# Patient Record
Sex: FEMALE | Race: BLACK OR AFRICAN AMERICAN | NOT HISPANIC OR LATINO | URBAN - METROPOLITAN AREA
[De-identification: names, ages, dates, MRNs, and addresses within clinical notes are randomized per-mention and may not be internally consistent; named-entity substitution may affect disease eponyms.]

---

## 2017-02-01 ENCOUNTER — OUTPATIENT (OUTPATIENT)
Dept: OUTPATIENT SERVICES | Facility: HOSPITAL | Age: 72
LOS: 1 days | End: 2017-02-01
Payer: COMMERCIAL

## 2017-02-01 ENCOUNTER — APPOINTMENT (OUTPATIENT)
Dept: CT IMAGING | Facility: CLINIC | Age: 72
End: 2017-02-01

## 2017-02-01 DIAGNOSIS — Z90.89 ACQUIRED ABSENCE OF OTHER ORGANS: Chronic | ICD-10-CM

## 2017-02-01 DIAGNOSIS — Z98.89 OTHER SPECIFIED POSTPROCEDURAL STATES: Chronic | ICD-10-CM

## 2017-02-01 DIAGNOSIS — Z90.710 ACQUIRED ABSENCE OF BOTH CERVIX AND UTERUS: Chronic | ICD-10-CM

## 2017-02-01 DIAGNOSIS — R93.429 ABNORMAL RADIOLOGIC FINDINGS ON DIAGNOSTIC IMAGING OF UNSPECIFIED KIDNEY: ICD-10-CM

## 2017-02-01 PROCEDURE — 74176 CT ABD & PELVIS W/O CONTRAST: CPT

## 2017-12-20 ENCOUNTER — APPOINTMENT (OUTPATIENT)
Dept: ORTHOPEDIC SURGERY | Facility: CLINIC | Age: 72
End: 2017-12-20

## 2018-03-07 ENCOUNTER — APPOINTMENT (OUTPATIENT)
Dept: ORTHOPEDIC SURGERY | Facility: CLINIC | Age: 73
End: 2018-03-07
Payer: MEDICARE

## 2018-03-07 VITALS — BODY MASS INDEX: 39.01 KG/M2 | WEIGHT: 212 LBS | HEIGHT: 62 IN

## 2018-03-07 DIAGNOSIS — Z60.2 PROBLEMS RELATED TO LIVING ALONE: ICD-10-CM

## 2018-03-07 DIAGNOSIS — Z87.39 PERSONAL HISTORY OF OTHER DISEASES OF THE MUSCULOSKELETAL SYSTEM AND CONNECTIVE TISSUE: ICD-10-CM

## 2018-03-07 DIAGNOSIS — Z82.61 FAMILY HISTORY OF ARTHRITIS: ICD-10-CM

## 2018-03-07 DIAGNOSIS — Z82.62 FAMILY HISTORY OF OSTEOPOROSIS: ICD-10-CM

## 2018-03-07 DIAGNOSIS — Z78.9 OTHER SPECIFIED HEALTH STATUS: ICD-10-CM

## 2018-03-07 DIAGNOSIS — M19.012 PRIMARY OSTEOARTHRITIS, LEFT SHOULDER: ICD-10-CM

## 2018-03-07 DIAGNOSIS — Z86.79 PERSONAL HISTORY OF OTHER DISEASES OF THE CIRCULATORY SYSTEM: ICD-10-CM

## 2018-03-07 DIAGNOSIS — Z80.9 FAMILY HISTORY OF MALIGNANT NEOPLASM, UNSPECIFIED: ICD-10-CM

## 2018-03-07 PROCEDURE — 99203 OFFICE O/P NEW LOW 30 MIN: CPT

## 2018-03-07 PROCEDURE — 73030 X-RAY EXAM OF SHOULDER: CPT | Mod: LT

## 2018-03-07 RX ORDER — LOSARTAN POTASSIUM AND HYDROCHLOROTHIAZIDE 12.5; 5 MG/1; MG/1
50-12.5 TABLET ORAL
Qty: 90 | Refills: 0 | Status: ACTIVE | COMMUNITY
Start: 2017-12-06

## 2018-03-07 SDOH — SOCIAL STABILITY - SOCIAL INSECURITY: PROBLEMS RELATED TO LIVING ALONE: Z60.2

## 2018-03-15 ENCOUNTER — OTHER (OUTPATIENT)
Age: 73
End: 2018-03-15

## 2018-03-19 ENCOUNTER — OUTPATIENT (OUTPATIENT)
Dept: OUTPATIENT SERVICES | Facility: HOSPITAL | Age: 73
LOS: 1 days | Discharge: HOME | End: 2018-03-19
Payer: COMMERCIAL

## 2018-03-19 VITALS
WEIGHT: 215.39 LBS | RESPIRATION RATE: 17 BRPM | DIASTOLIC BLOOD PRESSURE: 76 MMHG | HEIGHT: 62 IN | SYSTOLIC BLOOD PRESSURE: 145 MMHG | OXYGEN SATURATION: 99 % | HEART RATE: 70 BPM | TEMPERATURE: 98 F

## 2018-03-19 DIAGNOSIS — Z96.651 PRESENCE OF RIGHT ARTIFICIAL KNEE JOINT: Chronic | ICD-10-CM

## 2018-03-19 DIAGNOSIS — Z98.89 OTHER SPECIFIED POSTPROCEDURAL STATES: Chronic | ICD-10-CM

## 2018-03-19 DIAGNOSIS — M19.012 PRIMARY OSTEOARTHRITIS, LEFT SHOULDER: ICD-10-CM

## 2018-03-19 DIAGNOSIS — I10 ESSENTIAL (PRIMARY) HYPERTENSION: ICD-10-CM

## 2018-03-19 DIAGNOSIS — Z90.710 ACQUIRED ABSENCE OF BOTH CERVIX AND UTERUS: Chronic | ICD-10-CM

## 2018-03-19 DIAGNOSIS — M19.90 UNSPECIFIED OSTEOARTHRITIS, UNSPECIFIED SITE: ICD-10-CM

## 2018-03-19 DIAGNOSIS — Z90.89 ACQUIRED ABSENCE OF OTHER ORGANS: Chronic | ICD-10-CM

## 2018-03-19 DIAGNOSIS — Z01.818 ENCOUNTER FOR OTHER PREPROCEDURAL EXAMINATION: ICD-10-CM

## 2018-03-19 LAB
ANION GAP SERPL CALC-SCNC: 6 MMOL/L — SIGNIFICANT CHANGE UP (ref 5–17)
APTT BLD: 26.4 SEC — LOW (ref 27.5–37.4)
BASOPHILS # BLD AUTO: 0.04 K/UL — SIGNIFICANT CHANGE UP (ref 0–0.2)
BASOPHILS NFR BLD AUTO: 0.5 % — SIGNIFICANT CHANGE UP (ref 0–2)
BUN SERPL-MCNC: 19 MG/DL — SIGNIFICANT CHANGE UP (ref 7–23)
CALCIUM SERPL-MCNC: 9.3 MG/DL — SIGNIFICANT CHANGE UP (ref 8.5–10.1)
CHLORIDE SERPL-SCNC: 107 MMOL/L — SIGNIFICANT CHANGE UP (ref 96–108)
CO2 SERPL-SCNC: 30 MMOL/L — SIGNIFICANT CHANGE UP (ref 22–31)
CREAT SERPL-MCNC: 0.89 MG/DL — SIGNIFICANT CHANGE UP (ref 0.5–1.3)
EOSINOPHIL # BLD AUTO: 0.03 K/UL — SIGNIFICANT CHANGE UP (ref 0–0.5)
EOSINOPHIL NFR BLD AUTO: 0.4 % — SIGNIFICANT CHANGE UP (ref 0–6)
GLUCOSE SERPL-MCNC: 113 MG/DL — HIGH (ref 70–99)
HBA1C BLD-MCNC: 5.5 % — SIGNIFICANT CHANGE UP (ref 4–5.6)
HCT VFR BLD CALC: 35.3 % — SIGNIFICANT CHANGE UP (ref 34.5–45)
HGB BLD-MCNC: 11.5 G/DL — SIGNIFICANT CHANGE UP (ref 11.5–15.5)
IMM GRANULOCYTES NFR BLD AUTO: 0.1 % — SIGNIFICANT CHANGE UP (ref 0–1.5)
INR BLD: 1.05 RATIO — SIGNIFICANT CHANGE UP (ref 0.88–1.16)
LYMPHOCYTES # BLD AUTO: 1.88 K/UL — SIGNIFICANT CHANGE UP (ref 1–3.3)
LYMPHOCYTES # BLD AUTO: 24.5 % — SIGNIFICANT CHANGE UP (ref 13–44)
MCHC RBC-ENTMCNC: 30 PG — SIGNIFICANT CHANGE UP (ref 27–34)
MCHC RBC-ENTMCNC: 32.6 GM/DL — SIGNIFICANT CHANGE UP (ref 32–36)
MCV RBC AUTO: 92.2 FL — SIGNIFICANT CHANGE UP (ref 80–100)
MONOCYTES # BLD AUTO: 0.37 K/UL — SIGNIFICANT CHANGE UP (ref 0–0.9)
MONOCYTES NFR BLD AUTO: 4.8 % — SIGNIFICANT CHANGE UP (ref 2–14)
MRSA PCR RESULT.: SIGNIFICANT CHANGE UP
NEUTROPHILS # BLD AUTO: 5.35 K/UL — SIGNIFICANT CHANGE UP (ref 1.8–7.4)
NEUTROPHILS NFR BLD AUTO: 69.7 % — SIGNIFICANT CHANGE UP (ref 43–77)
PLATELET # BLD AUTO: 249 K/UL — SIGNIFICANT CHANGE UP (ref 150–400)
POTASSIUM SERPL-MCNC: 3.7 MMOL/L — SIGNIFICANT CHANGE UP (ref 3.5–5.3)
POTASSIUM SERPL-SCNC: 3.7 MMOL/L — SIGNIFICANT CHANGE UP (ref 3.5–5.3)
PROTHROM AB SERPL-ACNC: 11.5 SEC — SIGNIFICANT CHANGE UP (ref 9.8–12.7)
RBC # BLD: 3.83 M/UL — SIGNIFICANT CHANGE UP (ref 3.8–5.2)
RBC # FLD: 13.3 % — SIGNIFICANT CHANGE UP (ref 10.3–14.5)
S AUREUS DNA NOSE QL NAA+PROBE: SIGNIFICANT CHANGE UP
SODIUM SERPL-SCNC: 143 MMOL/L — SIGNIFICANT CHANGE UP (ref 135–145)
WBC # BLD: 7.68 K/UL — SIGNIFICANT CHANGE UP (ref 3.8–10.5)
WBC # FLD AUTO: 7.68 K/UL — SIGNIFICANT CHANGE UP (ref 3.8–10.5)

## 2018-03-19 PROCEDURE — 93010 ELECTROCARDIOGRAM REPORT: CPT | Mod: NC

## 2018-03-19 RX ORDER — SODIUM CHLORIDE 9 MG/ML
3 INJECTION INTRAMUSCULAR; INTRAVENOUS; SUBCUTANEOUS EVERY 8 HOURS
Qty: 0 | Refills: 0 | Status: DISCONTINUED | OUTPATIENT
Start: 2018-04-26 | End: 2018-04-27

## 2018-03-19 NOTE — H&P PST ADULT - PMH
Arthritis    Hypertension    Knee pain, right    Thyroid mass  Ct chest finding 1/12/16; enlarged right lobe of thyroid gland extending to mediastinum

## 2018-03-19 NOTE — PATIENT PROFILE ADULT. - PSH
History of total right knee replacement    S/P colonoscopy  4 years ago  S/P endoscopy  2 years ago  S/P JOSH (total abdominal hysterectomy)    S/P tonsillectomy

## 2018-03-19 NOTE — H&P PST ADULT - HISTORY OF PRESENT ILLNESS
73F [facility] ____ here for PST for scheduled ______ 73F [facility] htn, goiter, narrow angle glaucoma, arthritis  here for PST for scheduled Left total shoulder replacement vs. Reverse total shoulder replacement

## 2018-03-19 NOTE — H&P PST ADULT - PSH
S/P colonoscopy  4 years ago  S/P endoscopy  2 years ago  S/P JOSH (total abdominal hysterectomy)    S/P tonsillectomy History of total right knee replacement    S/P colonoscopy  4 years ago  S/P endoscopy  2 years ago  S/P JOSH (total abdominal hysterectomy)    S/P tonsillectomy

## 2018-03-19 NOTE — H&P PST ADULT - NSANTHOSAYNRD_GEN_A_CORE
No. NEEMA screening performed.  STOP BANG Legend: 0-2 = LOW Risk; 3-4 = INTERMEDIATE Risk; 5-8 = HIGH Risk

## 2018-03-19 NOTE — H&P PST ADULT - FAMILY HISTORY
Mother  Still living? No  Family history of diabetes mellitus, Age at diagnosis: Age Unknown     Father  Still living? No  Family history of hypertension, Age at diagnosis: Age Unknown

## 2018-03-19 NOTE — H&P PST ADULT - ASSESSMENT
73F [facility] htn, goiter, narrow angle glaucoma, arthritis  here for PST for scheduled Left total shoulder replacement vs. Reverse total shoulder replacement

## 2018-04-25 ENCOUNTER — TRANSCRIPTION ENCOUNTER (OUTPATIENT)
Age: 73
End: 2018-04-25

## 2018-04-26 ENCOUNTER — INPATIENT (INPATIENT)
Facility: HOSPITAL | Age: 73
LOS: 0 days | Discharge: HOME HEALTH | End: 2018-04-27
Attending: ORTHOPAEDIC SURGERY | Admitting: ORTHOPAEDIC SURGERY
Payer: COMMERCIAL

## 2018-04-26 ENCOUNTER — RESULT REVIEW (OUTPATIENT)
Age: 73
End: 2018-04-26

## 2018-04-26 ENCOUNTER — APPOINTMENT (OUTPATIENT)
Dept: ORTHOPEDIC SURGERY | Facility: HOSPITAL | Age: 73
End: 2018-04-26

## 2018-04-26 ENCOUNTER — TRANSCRIPTION ENCOUNTER (OUTPATIENT)
Age: 73
End: 2018-04-26

## 2018-04-26 VITALS
RESPIRATION RATE: 16 BRPM | TEMPERATURE: 98 F | DIASTOLIC BLOOD PRESSURE: 73 MMHG | OXYGEN SATURATION: 98 % | WEIGHT: 215.39 LBS | HEIGHT: 62 IN | HEART RATE: 88 BPM | SYSTOLIC BLOOD PRESSURE: 145 MMHG

## 2018-04-26 DIAGNOSIS — Z90.710 ACQUIRED ABSENCE OF BOTH CERVIX AND UTERUS: Chronic | ICD-10-CM

## 2018-04-26 DIAGNOSIS — Z98.89 OTHER SPECIFIED POSTPROCEDURAL STATES: Chronic | ICD-10-CM

## 2018-04-26 DIAGNOSIS — Z96.651 PRESENCE OF RIGHT ARTIFICIAL KNEE JOINT: Chronic | ICD-10-CM

## 2018-04-26 DIAGNOSIS — Z90.89 ACQUIRED ABSENCE OF OTHER ORGANS: Chronic | ICD-10-CM

## 2018-04-26 LAB
ANION GAP SERPL CALC-SCNC: 8 MMOL/L — SIGNIFICANT CHANGE UP (ref 5–17)
BUN SERPL-MCNC: 22 MG/DL — SIGNIFICANT CHANGE UP (ref 7–23)
CALCIUM SERPL-MCNC: 9.3 MG/DL — SIGNIFICANT CHANGE UP (ref 8.5–10.1)
CHLORIDE SERPL-SCNC: 108 MMOL/L — SIGNIFICANT CHANGE UP (ref 96–108)
CO2 SERPL-SCNC: 26 MMOL/L — SIGNIFICANT CHANGE UP (ref 22–31)
CREAT SERPL-MCNC: 1.07 MG/DL — SIGNIFICANT CHANGE UP (ref 0.5–1.3)
GLUCOSE SERPL-MCNC: 127 MG/DL — HIGH (ref 70–99)
HCT VFR BLD CALC: 31.7 % — LOW (ref 34.5–45)
HGB BLD-MCNC: 10.3 G/DL — LOW (ref 11.5–15.5)
MCHC RBC-ENTMCNC: 30.5 PG — SIGNIFICANT CHANGE UP (ref 27–34)
MCHC RBC-ENTMCNC: 32.5 GM/DL — SIGNIFICANT CHANGE UP (ref 32–36)
MCV RBC AUTO: 93.8 FL — SIGNIFICANT CHANGE UP (ref 80–100)
NRBC # BLD: 0 /100 WBCS — SIGNIFICANT CHANGE UP (ref 0–0)
PLATELET # BLD AUTO: 241 K/UL — SIGNIFICANT CHANGE UP (ref 150–400)
POTASSIUM SERPL-MCNC: 3.8 MMOL/L — SIGNIFICANT CHANGE UP (ref 3.5–5.3)
POTASSIUM SERPL-SCNC: 3.8 MMOL/L — SIGNIFICANT CHANGE UP (ref 3.5–5.3)
RBC # BLD: 3.38 M/UL — LOW (ref 3.8–5.2)
RBC # FLD: 13.1 % — SIGNIFICANT CHANGE UP (ref 10.3–14.5)
SODIUM SERPL-SCNC: 142 MMOL/L — SIGNIFICANT CHANGE UP (ref 135–145)
WBC # BLD: 12.83 K/UL — HIGH (ref 3.8–10.5)
WBC # FLD AUTO: 12.83 K/UL — HIGH (ref 3.8–10.5)

## 2018-04-26 PROCEDURE — 23472 RECONSTRUCT SHOULDER JOINT: CPT | Mod: LT

## 2018-04-26 PROCEDURE — 23430 REPAIR BICEPS TENDON: CPT | Mod: 59,LT

## 2018-04-26 PROCEDURE — 88309 TISSUE EXAM BY PATHOLOGIST: CPT | Mod: 26

## 2018-04-26 PROCEDURE — 73030 X-RAY EXAM OF SHOULDER: CPT | Mod: 26,LT

## 2018-04-26 PROCEDURE — 88311 DECALCIFY TISSUE: CPT | Mod: 26

## 2018-04-26 RX ORDER — LOSARTAN POTASSIUM 100 MG/1
50 TABLET, FILM COATED ORAL DAILY
Qty: 0 | Refills: 0 | Status: DISCONTINUED | OUTPATIENT
Start: 2018-04-26 | End: 2018-04-27

## 2018-04-26 RX ORDER — ZALEPLON 10 MG
5 CAPSULE ORAL AT BEDTIME
Qty: 0 | Refills: 0 | Status: DISCONTINUED | OUTPATIENT
Start: 2018-04-26 | End: 2018-04-27

## 2018-04-26 RX ORDER — DOCUSATE SODIUM 100 MG
100 CAPSULE ORAL
Qty: 0 | Refills: 0 | Status: DISCONTINUED | OUTPATIENT
Start: 2018-04-26 | End: 2018-04-27

## 2018-04-26 RX ORDER — BENZOCAINE AND MENTHOL 5; 1 G/100ML; G/100ML
1 LIQUID ORAL
Qty: 0 | Refills: 0 | Status: DISCONTINUED | OUTPATIENT
Start: 2018-04-26 | End: 2018-04-27

## 2018-04-26 RX ORDER — ASCORBIC ACID 60 MG
500 TABLET,CHEWABLE ORAL
Qty: 0 | Refills: 0 | Status: DISCONTINUED | OUTPATIENT
Start: 2018-04-26 | End: 2018-04-27

## 2018-04-26 RX ORDER — FERROUS SULFATE 325(65) MG
325 TABLET ORAL
Qty: 0 | Refills: 0 | Status: DISCONTINUED | OUTPATIENT
Start: 2018-04-26 | End: 2018-04-27

## 2018-04-26 RX ORDER — ACETAMINOPHEN 500 MG
1000 TABLET ORAL ONCE
Qty: 0 | Refills: 0 | Status: COMPLETED | OUTPATIENT
Start: 2018-04-26 | End: 2018-04-26

## 2018-04-26 RX ORDER — HYDROCHLOROTHIAZIDE 25 MG
12.5 TABLET ORAL DAILY
Qty: 0 | Refills: 0 | Status: DISCONTINUED | OUTPATIENT
Start: 2018-04-26 | End: 2018-04-27

## 2018-04-26 RX ORDER — ACETAMINOPHEN 500 MG
650 TABLET ORAL EVERY 6 HOURS
Qty: 0 | Refills: 0 | Status: DISCONTINUED | OUTPATIENT
Start: 2018-04-26 | End: 2018-04-27

## 2018-04-26 RX ORDER — SODIUM CHLORIDE 9 MG/ML
1000 INJECTION, SOLUTION INTRAVENOUS
Qty: 0 | Refills: 0 | Status: DISCONTINUED | OUTPATIENT
Start: 2018-04-26 | End: 2018-04-26

## 2018-04-26 RX ORDER — OXYCODONE HYDROCHLORIDE 5 MG/1
5 TABLET ORAL EVERY 4 HOURS
Qty: 0 | Refills: 0 | Status: DISCONTINUED | OUTPATIENT
Start: 2018-04-26 | End: 2018-04-27

## 2018-04-26 RX ORDER — DIPHENHYDRAMINE HCL 50 MG
25 CAPSULE ORAL EVERY 6 HOURS
Qty: 0 | Refills: 0 | Status: DISCONTINUED | OUTPATIENT
Start: 2018-04-26 | End: 2018-04-27

## 2018-04-26 RX ORDER — MORPHINE SULFATE 50 MG/1
4 CAPSULE, EXTENDED RELEASE ORAL
Qty: 0 | Refills: 0 | Status: DISCONTINUED | OUTPATIENT
Start: 2018-04-26 | End: 2018-04-26

## 2018-04-26 RX ORDER — HYDROMORPHONE HYDROCHLORIDE 2 MG/ML
0.5 INJECTION INTRAMUSCULAR; INTRAVENOUS; SUBCUTANEOUS
Qty: 0 | Refills: 0 | Status: DISCONTINUED | OUTPATIENT
Start: 2018-04-26 | End: 2018-04-27

## 2018-04-26 RX ORDER — CEFAZOLIN SODIUM 1 G
2000 VIAL (EA) INJECTION EVERY 8 HOURS
Qty: 0 | Refills: 0 | Status: COMPLETED | OUTPATIENT
Start: 2018-04-26 | End: 2018-04-27

## 2018-04-26 RX ORDER — ONDANSETRON 8 MG/1
4 TABLET, FILM COATED ORAL ONCE
Qty: 0 | Refills: 0 | Status: DISCONTINUED | OUTPATIENT
Start: 2018-04-26 | End: 2018-04-26

## 2018-04-26 RX ORDER — MAGNESIUM HYDROXIDE 400 MG/1
30 TABLET, CHEWABLE ORAL DAILY
Qty: 0 | Refills: 0 | Status: DISCONTINUED | OUTPATIENT
Start: 2018-04-26 | End: 2018-04-27

## 2018-04-26 RX ORDER — ASPIRIN/CALCIUM CARB/MAGNESIUM 324 MG
1 TABLET ORAL
Qty: 30 | Refills: 0
Start: 2018-04-26 | End: 2018-05-25

## 2018-04-26 RX ORDER — ONDANSETRON 8 MG/1
4 TABLET, FILM COATED ORAL EVERY 6 HOURS
Qty: 0 | Refills: 0 | Status: DISCONTINUED | OUTPATIENT
Start: 2018-04-26 | End: 2018-04-27

## 2018-04-26 RX ORDER — FOLIC ACID 0.8 MG
1 TABLET ORAL DAILY
Qty: 0 | Refills: 0 | Status: DISCONTINUED | OUTPATIENT
Start: 2018-04-26 | End: 2018-04-27

## 2018-04-26 RX ORDER — OXYCODONE HYDROCHLORIDE 5 MG/1
10 TABLET ORAL EVERY 4 HOURS
Qty: 0 | Refills: 0 | Status: DISCONTINUED | OUTPATIENT
Start: 2018-04-26 | End: 2018-04-27

## 2018-04-26 RX ORDER — ASPIRIN/CALCIUM CARB/MAGNESIUM 324 MG
325 TABLET ORAL DAILY
Qty: 0 | Refills: 0 | Status: DISCONTINUED | OUTPATIENT
Start: 2018-04-27 | End: 2018-04-27

## 2018-04-26 RX ORDER — SODIUM CHLORIDE 9 MG/ML
1000 INJECTION, SOLUTION INTRAVENOUS
Qty: 0 | Refills: 0 | Status: DISCONTINUED | OUTPATIENT
Start: 2018-04-26 | End: 2018-04-27

## 2018-04-26 RX ADMIN — BENZOCAINE AND MENTHOL 1 LOZENGE: 5; 1 LIQUID ORAL at 22:25

## 2018-04-26 RX ADMIN — Medication 100 MILLIGRAM(S): at 23:05

## 2018-04-26 RX ADMIN — SODIUM CHLORIDE 3 MILLILITER(S): 9 INJECTION INTRAMUSCULAR; INTRAVENOUS; SUBCUTANEOUS at 22:16

## 2018-04-26 RX ADMIN — Medication 400 MILLIGRAM(S): at 17:55

## 2018-04-26 RX ADMIN — SODIUM CHLORIDE 75 MILLILITER(S): 9 INJECTION, SOLUTION INTRAVENOUS at 17:55

## 2018-04-26 RX ADMIN — Medication 1000 MILLIGRAM(S): at 18:23

## 2018-04-26 RX ADMIN — SODIUM CHLORIDE 75 MILLILITER(S): 9 INJECTION, SOLUTION INTRAVENOUS at 18:00

## 2018-04-26 NOTE — DISCHARGE NOTE ADULT - PLAN OF CARE
return to ADLs Reverse Total Shoulder Discharge Instructions     1. Activity:  Maintain sling at all times. Elevate hand and wiggle fingers. ROM restricted to GENTLE Internal rotation to chest, Full forward elevation, Neutral external rotation, no extension, no abduction.    2. Call with fever over 101, wound redness, drainage.    3. Wound care: Do not remove or change dressing unless saturated.  IF so, apply dry gauze, tegaderm.     4. Continue to apply ICE packs.    5. DVT PE Prophylaxis: ECASA 325 QD OR Lovenox for 2 weeks or per AC Team. See med Rec.    6. Follow Up: Orthopedics,  10-14 days in office. Call to schedule. If going home, eRx sent to your pharmacy for . Please refer to instruction sheet provided by Dr. Catherine

## 2018-04-26 NOTE — DISCHARGE NOTE ADULT - CARE PLAN
Principal Discharge DX:	Rotator cuff arthropathy  Goal:	return to ADLs  Assessment and plan of treatment:	Reverse Total Shoulder Discharge Instructions     1. Activity:  Maintain sling at all times. Elevate hand and wiggle fingers. ROM restricted to GENTLE Internal rotation to chest, Full forward elevation, Neutral external rotation, no extension, no abduction.    2. Call with fever over 101, wound redness, drainage.    3. Wound care: Do not remove or change dressing unless saturated.  IF so, apply dry gauze, tegaderm.     4. Continue to apply ICE packs.    5. DVT PE Prophylaxis: ECASA 325 QD OR Lovenox for 2 weeks or per AC Team. See med Rec.    6. Follow Up: Orthopedics,  10-14 days in office. Call to schedule. If going home, eRx sent to your pharmacy for . Principal Discharge DX:	Rotator cuff arthropathy  Goal:	return to ADLs  Assessment and plan of treatment:	Please refer to instruction sheet provided by Dr. Catherine

## 2018-04-26 NOTE — PROGRESS NOTE ADULT - SUBJECTIVE AND OBJECTIVE BOX
Ortho post op check     Patient seen and examined. Pain controlled. Tolerated procedure well     Vital Signs Last 24 Hrs  T(C): 37.3 (04-26-18 @ 22:14), Max: 37.7 (04-26-18 @ 20:06)  T(F): 99.1 (04-26-18 @ 22:14), Max: 99.9 (04-26-18 @ 20:06)  HR: 85 (04-26-18 @ 22:14) (78 - 88)  BP: 111/71 (04-26-18 @ 22:14) (111/71 - 162/75)  BP(mean): --  RR: 16 (04-26-18 @ 22:14) (14 - 22)  SpO2: 96% (04-26-18 @ 22:14) (95% - 100%)    Physical Exam  Gen: NAD  LUE:   Dressing c/d/i  +AIN/PIN/Med/Msk/Uln function  Unable to fully assess sensation as patient recieved nerve block   Rad pulse intact  Compartments soft  No calf ttp    A/P: 73y Female sp L Reverse TSA  Pain control  DVT ppx  PT/WBAT/OOB  FU labs  Dispo planning  D/w attending

## 2018-04-26 NOTE — DISCHARGE NOTE ADULT - CARE PROVIDER_API CALL
Bunny Catherine), Orthopaedic Surgery  [facility] [place] [address]  Phone: [phone]  Fax: [phone]

## 2018-04-26 NOTE — DISCHARGE NOTE ADULT - HOSPITAL COURSE
H&P:  Pt is a 73y Female  PAST MEDICAL & SURGICAL HISTORY:  Thyroid mass: Ct chest finding 1/12/16; enlarged right lobe of thyroid gland extending to mediastinum  Knee pain, right  Arthritis  Hypertension  History of total right knee replacement  S/P endoscopy: 2 years ago  S/P colonoscopy: 4 years ago  S/P tonsillectomy  S/P JOSH (total abdominal hysterectomy)   s/p Total Shoulder Arthroplasty. Pt is afebrile with stable vital signs. Pain is controlled. Alert and Oriented. Exam reveals intact AIN/PIN, wrist flexion and wrist extension, 2+Radial Pulse. Dressing is clean and dry with telfa/tegaderm on.    Vital Signs Last 24 Hrs  T(C): 36.7 (04-26-18 @ 19:03), Max: 36.9 (04-26-18 @ 18:38)  T(F): 98 (04-26-18 @ 19:03), Max: 98.4 (04-26-18 @ 18:38)  HR: 84 (04-26-18 @ 19:03) (78 - 88)  BP: 131/58 (04-26-18 @ 19:03) (129/69 - 162/75)  BP(mean): --  RR: 17 (04-26-18 @ 19:03) (14 - 22)  SpO2: 95% (04-26-18 @ 19:03) (95% - 100%)                        10.3   12.83 )-----------( 241      ( 26 Apr 2018 18:07 )             31.7         Hospital Course:  Patient presented to [facility] after being medically cleared for an elective surgical procedure, having failed outpatient non-operative conservative management. Prophylactic antibiotics were started before the procedure and continued for 24 hours. They were admitted after surgery to the orthopedic floor.   There were no complications during the hospital stay.     Routine consults were obtained from the Anticoagulation Team for DVT/PE prophylaxis, from Physical Therapy for twice daily PT ROM limited to full forward elevation, Internal rotaion to Chest, external rotation to neutral, no extension. Consult to Hospitalist for Medical Co-management. Patient was placed on Lovenox for anticoagulation.  Pertinent home medications were continued.  Daily labs were followed.      On POD 0 there were no overnight events and the pt was OOB with PT. POD1 hemovac drain (if present) was removed. POD 1, PT was continued, and on POD 1 or 2 pt is ready today for DC to home or to Rehab. The orthopedic Attending is aware and agrees. H&P:  Pt is a 73y Female  PAST MEDICAL & SURGICAL HISTORY:  Thyroid mass: Ct chest finding 1/12/16; enlarged right lobe of thyroid gland extending to mediastinum  Knee pain, right  Arthritis  Hypertension  History of total right knee replacement  S/P endoscopy: 2 years ago  S/P colonoscopy: 4 years ago  S/P tonsillectomy  S/P JOSH (total abdominal hysterectomy)   s/p Total Shoulder Arthroplasty. Pt is afebrile with stable vital signs. Pain is controlled. Alert and Oriented. Exam reveals intact AIN/PIN, wrist flexion and wrist extension, 2+Radial Pulse. Dressing is clean and dry with telfa/tegaderm on.    Vital Signs Last 24 Hrs  T(C): 36.7 (04-26-18 @ 19:03), Max: 36.9 (04-26-18 @ 18:38)  T(F): 98 (04-26-18 @ 19:03), Max: 98.4 (04-26-18 @ 18:38)  HR: 84 (04-26-18 @ 19:03) (78 - 88)  BP: 131/58 (04-26-18 @ 19:03) (129/69 - 162/75)  BP(mean): --  RR: 17 (04-26-18 @ 19:03) (14 - 22)  SpO2: 95% (04-26-18 @ 19:03) (95% - 100%)                        10.3   12.83 )-----------( 241      ( 26 Apr 2018 18:07 )             31.7         Hospital Course:  Patient presented to [facility] after being medically cleared for an elective surgical procedure, having failed outpatient non-operative conservative management. Prophylactic antibiotics were started before the procedure and continued for 24 hours. They were admitted after surgery to the orthopedic floor.   There were no complications during the hospital stay.     Patient was placed on Lovenox for anticoagulation.  Pertinent home medications were continued.  Daily labs were followed.      On POD 0 there were no overnight events and the pt was OOB with PT. POD1 hemovac drain was removed. POD 1 or 2 pt is ready today for DC to home. The orthopedic Attending is aware and agrees.

## 2018-04-26 NOTE — DISCHARGE NOTE ADULT - PATIENT PORTAL LINK FT
You can access the [facility], offered by [facility], by registering with the following website: [email or web address]

## 2018-04-26 NOTE — BRIEF OPERATIVE NOTE - PROCEDURE
<<-----Click on this checkbox to enter Procedure Reverse total shoulder arthroplasty  04/26/2018    Active  [facility]

## 2018-04-26 NOTE — DISCHARGE NOTE ADULT - MEDICATION SUMMARY - MEDICATIONS TO TAKE
I will START or STAY ON the medications listed below when I get home from the hospital:    Ecotrin 325 mg oral delayed release tablet  -- 1 tab(s) by mouth once a day MDD:1  -- Swallow whole.  Do not crush.  Take with food or milk.    -- Indication: For dvt ppx    Percocet 5/325 oral tablet  -- 1 tab(s) by mouth every 4 to 6 hours, As Needed -for severe pain MDD:4-6 tabs   -- Caution federal law prohibits the transfer of this drug to any person other  than the person for whom it was prescribed.  May cause drowsiness.  Alcohol may intensify this effect.  Use care when operating dangerous machinery.  This prescription cannot be refilled.  This product contains acetaminophen.  Do not use  with any other product containing acetaminophen to prevent possible liver damage.  Using more of this medication than prescribed may cause serious breathing problems.    -- Indication: For severe pain    Chlorpheniramine (Allergy)  -- 1   2 times a day, As Needed  -- Indication: For home med    hydrochlorothiazide-losartan 12.5 mg-50 mg oral tablet  -- 1 tab(s) by mouth once a day  -- Indication: For home med    Colace 100 mg oral capsule  -- 2 cap(s) by mouth once a day  -- Indication: For home med    Calcium 600+D oral tablet  -- 1 tab(s) by mouth once a day  -- Indication: For home med    Vitamin D3 2000 intl units oral tablet  -- 1 tab(s) by mouth once a day  -- Indication: For home med

## 2018-04-26 NOTE — DISCHARGE NOTE ADULT - MEDICATION SUMMARY - MEDICATIONS TO STOP TAKING
I will STOP taking the medications listed below when I get home from the hospital:    acetaminophen 500 mg oral tablet  -- 2 tab(s) by mouth every 8 hours, As Needed    traMADol 50 mg oral tablet  -- 1 tab(s) by mouth every 4 hours, As needed, Mild Pain    tramadol-acetaminophen 37.5 mg-325 mg oral tablet  -- 1 tab(s) by mouth once a day, As Needed

## 2018-04-27 VITALS
RESPIRATION RATE: 17 BRPM | TEMPERATURE: 98 F | SYSTOLIC BLOOD PRESSURE: 109 MMHG | HEART RATE: 83 BPM | OXYGEN SATURATION: 99 % | DIASTOLIC BLOOD PRESSURE: 55 MMHG

## 2018-04-27 LAB
ANION GAP SERPL CALC-SCNC: 8 MMOL/L — SIGNIFICANT CHANGE UP (ref 5–17)
BUN SERPL-MCNC: 20 MG/DL — SIGNIFICANT CHANGE UP (ref 7–23)
CALCIUM SERPL-MCNC: 8.8 MG/DL — SIGNIFICANT CHANGE UP (ref 8.5–10.1)
CHLORIDE SERPL-SCNC: 106 MMOL/L — SIGNIFICANT CHANGE UP (ref 96–108)
CO2 SERPL-SCNC: 27 MMOL/L — SIGNIFICANT CHANGE UP (ref 22–31)
CREAT SERPL-MCNC: 1.04 MG/DL — SIGNIFICANT CHANGE UP (ref 0.5–1.3)
GLUCOSE SERPL-MCNC: 108 MG/DL — HIGH (ref 70–99)
HCT VFR BLD CALC: 27.3 % — LOW (ref 34.5–45)
HGB BLD-MCNC: 9 G/DL — LOW (ref 11.5–15.5)
MCHC RBC-ENTMCNC: 30.7 PG — SIGNIFICANT CHANGE UP (ref 27–34)
MCHC RBC-ENTMCNC: 33 GM/DL — SIGNIFICANT CHANGE UP (ref 32–36)
MCV RBC AUTO: 93.2 FL — SIGNIFICANT CHANGE UP (ref 80–100)
NRBC # BLD: 0 /100 WBCS — SIGNIFICANT CHANGE UP (ref 0–0)
PLATELET # BLD AUTO: 216 K/UL — SIGNIFICANT CHANGE UP (ref 150–400)
POTASSIUM SERPL-MCNC: 3.7 MMOL/L — SIGNIFICANT CHANGE UP (ref 3.5–5.3)
POTASSIUM SERPL-SCNC: 3.7 MMOL/L — SIGNIFICANT CHANGE UP (ref 3.5–5.3)
RBC # BLD: 2.93 M/UL — LOW (ref 3.8–5.2)
RBC # FLD: 13 % — SIGNIFICANT CHANGE UP (ref 10.3–14.5)
SODIUM SERPL-SCNC: 141 MMOL/L — SIGNIFICANT CHANGE UP (ref 135–145)
WBC # BLD: 13.94 K/UL — HIGH (ref 3.8–10.5)
WBC # FLD AUTO: 13.94 K/UL — HIGH (ref 3.8–10.5)

## 2018-04-27 RX ORDER — ONDANSETRON 8 MG/1
4 TABLET, FILM COATED ORAL EVERY 6 HOURS
Qty: 0 | Refills: 0 | Status: DISCONTINUED | OUTPATIENT
Start: 2018-04-27 | End: 2018-04-27

## 2018-04-27 RX ORDER — MORPHINE SULFATE 50 MG/1
2 CAPSULE, EXTENDED RELEASE ORAL
Qty: 0 | Refills: 0 | Status: DISCONTINUED | OUTPATIENT
Start: 2018-04-27 | End: 2018-04-27

## 2018-04-27 RX ADMIN — Medication 1 TABLET(S): at 12:12

## 2018-04-27 RX ADMIN — Medication 325 MILLIGRAM(S): at 09:39

## 2018-04-27 RX ADMIN — Medication 100 MILLIGRAM(S): at 06:15

## 2018-04-27 RX ADMIN — SODIUM CHLORIDE 3 MILLILITER(S): 9 INJECTION INTRAMUSCULAR; INTRAVENOUS; SUBCUTANEOUS at 14:53

## 2018-04-27 RX ADMIN — OXYCODONE HYDROCHLORIDE 10 MILLIGRAM(S): 5 TABLET ORAL at 17:18

## 2018-04-27 RX ADMIN — Medication 325 MILLIGRAM(S): at 12:12

## 2018-04-27 RX ADMIN — MORPHINE SULFATE 2 MILLIGRAM(S): 50 CAPSULE, EXTENDED RELEASE ORAL at 12:30

## 2018-04-27 RX ADMIN — BENZOCAINE AND MENTHOL 1 LOZENGE: 5; 1 LIQUID ORAL at 06:50

## 2018-04-27 RX ADMIN — Medication 325 MILLIGRAM(S): at 17:18

## 2018-04-27 RX ADMIN — Medication 1 MILLIGRAM(S): at 12:12

## 2018-04-27 RX ADMIN — SODIUM CHLORIDE 3 MILLILITER(S): 9 INJECTION INTRAMUSCULAR; INTRAVENOUS; SUBCUTANEOUS at 06:15

## 2018-04-27 RX ADMIN — OXYCODONE HYDROCHLORIDE 10 MILLIGRAM(S): 5 TABLET ORAL at 10:55

## 2018-04-27 RX ADMIN — Medication 500 MILLIGRAM(S): at 06:15

## 2018-04-27 RX ADMIN — OXYCODONE HYDROCHLORIDE 10 MILLIGRAM(S): 5 TABLET ORAL at 02:28

## 2018-04-27 RX ADMIN — OXYCODONE HYDROCHLORIDE 10 MILLIGRAM(S): 5 TABLET ORAL at 11:50

## 2018-04-27 RX ADMIN — OXYCODONE HYDROCHLORIDE 10 MILLIGRAM(S): 5 TABLET ORAL at 03:30

## 2018-04-27 RX ADMIN — OXYCODONE HYDROCHLORIDE 10 MILLIGRAM(S): 5 TABLET ORAL at 18:10

## 2018-04-27 RX ADMIN — MORPHINE SULFATE 2 MILLIGRAM(S): 50 CAPSULE, EXTENDED RELEASE ORAL at 12:15

## 2018-04-27 RX ADMIN — OXYCODONE HYDROCHLORIDE 10 MILLIGRAM(S): 5 TABLET ORAL at 06:48

## 2018-04-27 NOTE — PROGRESS NOTE ADULT - SUBJECTIVE AND OBJECTIVE BOX
Patient seen and examined. Pain controlled. No acute events overnight    Vital Signs Last 24 Hrs  T(C): 37.6 (04-27-18 @ 02:00), Max: 37.7 (04-26-18 @ 20:06)  T(F): 99.6 (04-27-18 @ 02:00), Max: 99.9 (04-26-18 @ 20:06)  HR: 86 (04-27-18 @ 02:00) (78 - 88)  BP: 121/54 (04-27-18 @ 02:00) (111/71 - 162/75)  BP(mean): --  RR: 16 (04-27-18 @ 02:00) (14 - 22)  SpO2: 96% (04-27-18 @ 02:00) (95% - 100%)    Physical Exam  Gen: NAD  RUE:   Dressing c/d/i  +AIN/PIN/Med/Msk/Uln function  C5-T1 silt  HMV in place  Rad pulse intact  Compartments soft  No calf ttp    A/P: 73y Female sp left reverse TSA POD 1  Pain control  DVT ppx  PT/WBAT/OOB  FU HMV output  FU labs  DC home today  D/w attending

## 2018-04-27 NOTE — OCCUPATIONAL THERAPY INITIAL EVALUATION ADULT - BED MOBILITY LIMITATIONS, REHAB EVAL
decreased ability to use arms for pushing/pulling/decreased ability to use legs for bridging/pushing decreased ability to use arms for pushing/pulling

## 2018-04-27 NOTE — OCCUPATIONAL THERAPY INITIAL EVALUATION ADULT - LIVES WITH, PROFILE
alone in a ranch-style house with steps for entry, but will use ramp. Pt has a chair lift inside the home and a shower tub/combination on the main floor with grab bars and a standard toilet./alone

## 2018-04-27 NOTE — OCCUPATIONAL THERAPY INITIAL EVALUATION ADULT - GENERAL OBSERVATIONS, REHAB EVAL
Pt was seen for initial OT eval, encountered supine in bed, A/AOx4 and followed commands. Left shoulder dressing was intact and shoulder sling was in place. Pt was instructed on NWB status of LUE and reverse total shoulder precautions (no movement of shoulder), and maintained them throughout session. Pt exhibits deficits with functional mobility, ADL management, strength, ROM & balance. Pt was seen for initial OT eval, encountered supine in bed, A/AOx4 and followed commands. Left shoulder dressing was intact and shoulder pillow abduction sling was in place. Pt was instructed on NWB status of LUE and reverse total shoulder precautions (no movement of shoulder), and maintained them throughout session. Pt exhibits deficits with functional mobility, ADL management, strength, ROM & balance.

## 2018-04-27 NOTE — OCCUPATIONAL THERAPY INITIAL EVALUATION ADULT - SOCIAL CONCERNS
Complex psychosocial needs/coping issues/Pt voiced concerns about living alone. She states her friends might be around to help with her recovery.

## 2018-04-27 NOTE — OCCUPATIONAL THERAPY INITIAL EVALUATION ADULT - PHYSICAL ASSIST/NONPHYSICAL ASSIST:DRESS UPPER BODY, OT EVAL
verbal cues/NWB of LUE/no shoulder movement, with sling and one-handed technique/set-up required/1 person assist

## 2018-04-27 NOTE — OCCUPATIONAL THERAPY INITIAL EVALUATION ADULT - PLANNED THERAPY INTERVENTIONS, OT EVAL
endurance training/neuromuscular re-education/bed mobility training/fine motor coordination training/massage/ROM/strengthening/parent/caregiver training.../stretching/transfer training/ADL retraining/motor coordination training/IADL retraining/balance training

## 2018-04-27 NOTE — PHYSICAL THERAPY INITIAL EVALUATION ADULT - SOCIAL CONCERNS
pt stated that she may have difficulty doing certain tasks at home since she only can use her right arm

## 2018-04-27 NOTE — OCCUPATIONAL THERAPY INITIAL EVALUATION ADULT - COORDINATION ASSESSED, REHAB EVAL
finger to nose/heel to shin/LUE not tested due to s/p left reverse total shoulder replacement finger to nose/LUE not tested due to s/p left reverse total shoulder replacement/heel to shin

## 2018-04-27 NOTE — OCCUPATIONAL THERAPY INITIAL EVALUATION ADULT - PATIENT/FAMILY/SIGNIFICANT OTHER GOALS STATEMENT, OT EVAL
Pt would like to be restored to prior level of function and be independent in all of her home tasks. Pt would like to be restored to prior level of function and be independent in all of her home tasks, free of pain

## 2018-04-27 NOTE — PHYSICAL THERAPY INITIAL EVALUATION ADULT - IMPAIRMENTS FOUND, PT EVAL
ergonomics and body mechanics/gait, locomotion, and balance/gross motor/aerobic capacity/endurance/muscle strength

## 2018-04-27 NOTE — OCCUPATIONAL THERAPY INITIAL EVALUATION ADULT - RANGE OF MOTION EXAMINATION, UPPER EXTREMITY
Right UE Passive ROM was WNL (within normal limits)/Right UE Active ROM was WNL (within normal limits)

## 2018-04-27 NOTE — OCCUPATIONAL THERAPY INITIAL EVALUATION ADULT - RANGE OF MOTION EXAMINATION, LOWER EXTREMITY
Left shoulder not tested due to left reverse total shoulder precautions of no shoulder movement, elbow ROM diminished by 30%, wrist and digits WFL. Left shoulder not tested due to left reverse total shoulder precautions of no shoulder movement, elbow ROM diminished by >30%, wrist and digits WFL.

## 2018-04-27 NOTE — OCCUPATIONAL THERAPY INITIAL EVALUATION ADULT - PHYSICAL ASSIST/NONPHYSICAL ASSIST:DRESS LOWER BODY, OT EVAL
with one-handed technique/set-up required with one-handed technique/set-up required/verbal cues/1 person assist

## 2018-04-27 NOTE — PHYSICAL THERAPY INITIAL EVALUATION ADULT - DIAGNOSIS, PT EVAL
Decreased general endurance, ambulation balance, difficulty in bed mobility -supine to sit and vice versa.

## 2018-04-27 NOTE — OCCUPATIONAL THERAPY INITIAL EVALUATION ADULT - PERSONAL SAFETY AND JUDGMENT, REHAB EVAL
at risk behaviors demonstrated/Pt needs reminders to follow her left reverse total shoulder precautions and for safety awareness at risk behaviors demonstrated/Pt needs reminders to follow her left reverse total shoulder precautions and for safety awareness when negotiating turns.

## 2018-04-27 NOTE — OCCUPATIONAL THERAPY INITIAL EVALUATION ADULT - ADDITIONAL COMMENTS
Prior to admission, pt was functioning in one's roles, self sufficient, ambulating and driving with a single-axis cane (for extra safety) independently. Presently, pt needs assistance with all functional tasks and functional mobility due to LUE NWB status and LUE no shoulder movement precaution s/p left reverse shoulder replacement. The scale below depicts a picture of the pt's current level of functioning. Barthel Index: Feeding score:5  Bathing Score:0 Grooming Score:0  Dressing Score:5  Bowel Score:10  Bladder Score:10  Toilet Score:5  Transfer Score:10   Mobility Score:10  Stairs Score:5  Total Score:60/100. Pt is right hand dominant and wears glasses. Prior to admission, pt was functioning in one's roles, self sufficient, driving and ambulating with a single-axis cane (for extra safety) independently. Presently, pt needs assistance with all functional tasks and functional mobility due to LUE NWB status and LUE no shoulder movement precaution s/p left reverse shoulder replacement. Pt has difficulty donning/doffing sling due to it's complexity and her inability to move left shoulder due to precautions. Pt is unsafe to be alone at this time and requires supervision. The scale below depicts a picture of the pt's current level of functioning. Barthel Index: Feeding score:5  Bathing Score:0 Grooming Score:0  Dressing Score:5  Bowel Score:10  Bladder Score:10  Toilet Score:5  Transfer Score:10   Mobility Score:10  Stairs Score:5  Total Score:60/100. Pt is right hand dominant and wears glasses. Prior to admission, pt was functioning in one's roles, self sufficient, driving and ambulating with a single-axis cane (for extra safety) independently. Presently, pt needs assistance with all functional tasks and functional mobility due to LUE NWB status and LUE no shoulder movement precaution s/p left reverse shoulder replacement. Pt has difficulty donning/doffing sling due to it's complexity and her inability to move left shoulder due to precautions. Pt requires use of straight cane at this time to increase stability and balance for ADL management and functional ambulation. Pt is unsafe to be alone at this time and requires supervision. The scale below depicts a picture of the pt's current level of functioning. Barthel Index: Feeding score:5  Bathing Score:0 Grooming Score:0  Dressing Score:5  Bowel Score:10  Bladder Score:10  Toilet Score:5  Transfer Score:10   Mobility Score:10  Stairs Score:5  Total Score:60/100. Pt is right hand dominant and wears glasses.

## 2018-04-27 NOTE — PHYSICAL THERAPY INITIAL EVALUATION ADULT - PLANNED THERAPY INTERVENTIONS, PT EVAL
bed mobility training/gait training/transfer training/balance training/strengthening/postural re-education/stretching

## 2018-04-30 DIAGNOSIS — Z96.651 PRESENCE OF RIGHT ARTIFICIAL KNEE JOINT: ICD-10-CM

## 2018-04-30 DIAGNOSIS — M19.012 PRIMARY OSTEOARTHRITIS, LEFT SHOULDER: ICD-10-CM

## 2018-04-30 DIAGNOSIS — M75.102 UNSPECIFIED ROTATOR CUFF TEAR OR RUPTURE OF LEFT SHOULDER, NOT SPECIFIED AS TRAUMATIC: ICD-10-CM

## 2018-05-02 LAB — SURGICAL PATHOLOGY FINAL REPORT - CH: SIGNIFICANT CHANGE UP

## 2018-05-11 ENCOUNTER — APPOINTMENT (OUTPATIENT)
Dept: ORTHOPEDIC SURGERY | Facility: CLINIC | Age: 73
End: 2018-05-11
Payer: MEDICARE

## 2018-05-11 PROCEDURE — 99024 POSTOP FOLLOW-UP VISIT: CPT

## 2018-06-08 ENCOUNTER — APPOINTMENT (OUTPATIENT)
Dept: ORTHOPEDIC SURGERY | Facility: CLINIC | Age: 73
End: 2018-06-08
Payer: MEDICARE

## 2018-06-08 PROCEDURE — 73030 X-RAY EXAM OF SHOULDER: CPT | Mod: LT

## 2018-06-08 PROCEDURE — 99024 POSTOP FOLLOW-UP VISIT: CPT

## 2018-06-13 NOTE — PHYSICAL THERAPY INITIAL EVALUATION ADULT - LEVEL OF INDEPENDENCE, REHAB EVAL
decreased strength/impaired balance/impaired postural control/narrow base of support/impaired coordination supervision

## 2018-07-25 ENCOUNTER — APPOINTMENT (OUTPATIENT)
Dept: ORTHOPEDIC SURGERY | Facility: CLINIC | Age: 73
End: 2018-07-25
Payer: MEDICARE

## 2018-07-25 PROCEDURE — 73030 X-RAY EXAM OF SHOULDER: CPT | Mod: LT

## 2018-07-25 PROCEDURE — 99024 POSTOP FOLLOW-UP VISIT: CPT

## 2018-10-17 NOTE — PHYSICAL THERAPY INITIAL EVALUATION ADULT - STANDING BALANCE: STATIC
No orders of the defined types were placed in this encounter. No orders of the defined types were placed in this encounter. There are no preventive care reminders to display for this patient.     she'll continue medications and says she doesn't that goes call sooner if any problems      Controlled Substances Monitoring:     No flowsheet data found.         If anything worsens or changes please call us at once , follow-up in the office as planned,
fair balance

## 2018-10-26 ENCOUNTER — APPOINTMENT (OUTPATIENT)
Dept: ORTHOPEDIC SURGERY | Facility: CLINIC | Age: 73
End: 2018-10-26
Payer: MEDICARE

## 2018-10-26 PROCEDURE — 99213 OFFICE O/P EST LOW 20 MIN: CPT

## 2018-10-26 PROCEDURE — 73030 X-RAY EXAM OF SHOULDER: CPT | Mod: LT

## 2019-03-02 ENCOUNTER — TRANSCRIPTION ENCOUNTER (OUTPATIENT)
Age: 74
End: 2019-03-02

## 2019-04-12 ENCOUNTER — APPOINTMENT (OUTPATIENT)
Dept: ORTHOPEDIC SURGERY | Facility: CLINIC | Age: 74
End: 2019-04-12
Payer: MEDICARE

## 2019-04-12 DIAGNOSIS — Z96.612 PRESENCE OF LEFT ARTIFICIAL SHOULDER JOINT: ICD-10-CM

## 2019-04-12 PROCEDURE — 99213 OFFICE O/P EST LOW 20 MIN: CPT

## 2019-04-12 PROCEDURE — 73030 X-RAY EXAM OF SHOULDER: CPT | Mod: LT

## 2019-04-12 NOTE — PHYSICAL EXAM
[de-identified] : Left shoulder exam\par \par Inspection: No swelling, ecchymosis or gross deformity.\par Skin: No masses, No lesions\par Neck: Negative Spurlings, full ROM without pain\par Tenderness: No bicipital tenderness, no tenderness to the greater tuberosity/RTC insertion, no anterior shoulder/lesser tuberosity tenderness. No tenderness SC joint, clavicle, AC joint.\par ROM: 140/40/L5\par Impingement tests: Positive Garcia\par AC Joint: no pain with cross arm testing\par Biceps: Negative speed\par Strength: 5/5 abduction, external rotation, and internal rotation\par Neuro: AIN, PIN, Ulnar nerve motor intact\par Sensation: Intact to light touch in radial, median, ulnar, and axillary nerve distributions\par Vasc: 2+ radial pulse [de-identified] : \par The following radiographs were ordered and read by me during this patients visit. I reviewed each radiograph in detail with the patient and discussed the findings as highlighted below. \par 3 views left shoulder obtained today. There is a well-positioned reverse shoulder replacement no evidence of loosening there is normal alignment.\par

## 2019-04-12 NOTE — HISTORY OF PRESENT ILLNESS
[de-identified] : 73 y/o F presents 12 mo s/p Left reverse total shoulder arthroplasty. She is doing well, mild pain improving. She is doing home exercises. Denies numbness/tingling. \par overall very happy.

## 2019-04-12 NOTE — DISCUSSION/SUMMARY
[de-identified] : \par One year status post left reverse total shoulder replacement doing well overall very happy with her progress. She is clear to do activities as tolerated. She'll followup in 2 years with repeat x-rays.  all questions answered

## 2019-07-26 NOTE — PATIENT PROFILE ADULT. - PSH
No History of total right knee replacement    S/P colonoscopy  4 years ago  S/P endoscopy  2 years ago  S/P JOSH (total abdominal hysterectomy)    S/P tonsillectomy

## 2019-10-02 PROBLEM — Z60.2 PERSON LIVING ALONE: Status: ACTIVE | Noted: 2018-03-07

## 2022-08-30 NOTE — OCCUPATIONAL THERAPY INITIAL EVALUATION ADULT - FINE MOTOR COORDINATION, LEFT HAND, FINGER TO NOSE, OT EVAL
Discharge instructions were given to the patient by Zohreh Palomino RN. The patient left the Emergency Department ambulatory, alert and oriented and in no acute distress with 3 prescriptions. The patient was encouraged to call or return to the ED for worsening issues or problems and was encouraged to schedule a follow up appointment for continuing care. The patient verbalized understanding of discharge instructions and prescriptions, all questions were answered. The patient has no further concerns at this time. not appropriate to test

## 2022-10-04 ENCOUNTER — NON-APPOINTMENT (OUTPATIENT)
Age: 77
End: 2022-10-04

## 2022-10-12 ENCOUNTER — APPOINTMENT (OUTPATIENT)
Dept: ORTHOPEDIC SURGERY | Facility: CLINIC | Age: 77
End: 2022-10-12

## 2022-10-12 VITALS — HEIGHT: 62 IN | BODY MASS INDEX: 38.64 KG/M2 | WEIGHT: 210 LBS

## 2022-10-12 DIAGNOSIS — Z96.651 PRESENCE OF RIGHT ARTIFICIAL KNEE JOINT: ICD-10-CM

## 2022-10-12 PROCEDURE — 73564 X-RAY EXAM KNEE 4 OR MORE: CPT | Mod: LT

## 2022-10-12 PROCEDURE — 99204 OFFICE O/P NEW MOD 45 MIN: CPT

## 2022-10-12 NOTE — PHYSICAL EXAM
[de-identified] : Constitutional:Well nourished , well developed and in no acute distress\par Psychiatric: Alert and oriented to time place and person.Appropriate affect \par Skin:Head, neck, arms and lower extremities:no lesions or discoloration\par HEENT: Normocephalic, EOM intact, Nasal septum midline,\par Respiratory: Unlabored respirations,no audible wheezing ,no tachypnea, no cyanosis\par Cardiovascular: no leg swelling  no ankle edema no JVD, pulse regular\par Vascular: no calf or thigh tenderness, \par Peripheral pulses; intact\par Lymphatics:No groin adenopathy,no lymphedema lower  or upper extremities\par Left knee dorsal pedal pulse 1+ varus alignment range of motion 10/80 crepitus ligaments intact [de-identified] : Weightbearing x-rays left knee multiple views demonstrates end-stage left knee osteoarthritis tricompartmental bone-on-bone subchondral sclerosis marginal osteophytes

## 2022-10-12 NOTE — HISTORY OF PRESENT ILLNESS
[de-identified] : 77-year-old female presents complaining of spontaneous onset of chronic intermittent pain left knee provoked with any weightbearing.  Pain is unresponsive to medical management to date and compromises daily quality life.  Patient is approximately 5 years status post right total knee replacement with no complaints.

## 2022-10-12 NOTE — DISCUSSION/SUMMARY
[de-identified] : End-stage osteoarthritis left knee compromising quality of life and unresponsive medical management\par Plan left total knee replacement

## 2022-11-09 ENCOUNTER — OUTPATIENT (OUTPATIENT)
Dept: OUTPATIENT SERVICES | Facility: HOSPITAL | Age: 77
LOS: 1 days | End: 2022-11-09
Payer: COMMERCIAL

## 2022-11-09 VITALS
HEIGHT: 62 IN | WEIGHT: 210.1 LBS | TEMPERATURE: 98 F | RESPIRATION RATE: 14 BRPM | DIASTOLIC BLOOD PRESSURE: 73 MMHG | SYSTOLIC BLOOD PRESSURE: 147 MMHG | OXYGEN SATURATION: 98 % | HEART RATE: 86 BPM

## 2022-11-09 DIAGNOSIS — Z01.818 ENCOUNTER FOR OTHER PREPROCEDURAL EXAMINATION: ICD-10-CM

## 2022-11-09 DIAGNOSIS — Z96.651 PRESENCE OF RIGHT ARTIFICIAL KNEE JOINT: Chronic | ICD-10-CM

## 2022-11-09 DIAGNOSIS — Z90.710 ACQUIRED ABSENCE OF BOTH CERVIX AND UTERUS: Chronic | ICD-10-CM

## 2022-11-09 DIAGNOSIS — Z90.89 ACQUIRED ABSENCE OF OTHER ORGANS: Chronic | ICD-10-CM

## 2022-11-09 DIAGNOSIS — Z98.89 OTHER SPECIFIED POSTPROCEDURAL STATES: Chronic | ICD-10-CM

## 2022-11-09 DIAGNOSIS — M17.12 UNILATERAL PRIMARY OSTEOARTHRITIS, LEFT KNEE: ICD-10-CM

## 2022-11-09 LAB
A1C WITH ESTIMATED AVERAGE GLUCOSE RESULT: 5.8 % — HIGH (ref 4–5.6)
ALBUMIN SERPL ELPH-MCNC: 3.9 G/DL — SIGNIFICANT CHANGE UP (ref 3.3–5)
ALP SERPL-CCNC: 61 U/L — SIGNIFICANT CHANGE UP (ref 30–120)
ALT FLD-CCNC: 14 U/L DA — SIGNIFICANT CHANGE UP (ref 10–60)
ANION GAP SERPL CALC-SCNC: 9 MMOL/L — SIGNIFICANT CHANGE UP (ref 5–17)
APTT BLD: 26.9 SEC — LOW (ref 27.5–35.5)
AST SERPL-CCNC: 20 U/L — SIGNIFICANT CHANGE UP (ref 10–40)
BILIRUB SERPL-MCNC: 0.3 MG/DL — SIGNIFICANT CHANGE UP (ref 0.2–1.2)
BLD GP AB SCN SERPL QL: SIGNIFICANT CHANGE UP
BUN SERPL-MCNC: 27 MG/DL — HIGH (ref 7–23)
CALCIUM SERPL-MCNC: 9.8 MG/DL — SIGNIFICANT CHANGE UP (ref 8.4–10.5)
CHLORIDE SERPL-SCNC: 103 MMOL/L — SIGNIFICANT CHANGE UP (ref 96–108)
CO2 SERPL-SCNC: 28 MMOL/L — SIGNIFICANT CHANGE UP (ref 22–31)
CREAT SERPL-MCNC: 1.24 MG/DL — SIGNIFICANT CHANGE UP (ref 0.5–1.3)
EGFR: 45 ML/MIN/1.73M2 — LOW
ESTIMATED AVERAGE GLUCOSE: 120 MG/DL — HIGH (ref 68–114)
GLUCOSE SERPL-MCNC: 116 MG/DL — HIGH (ref 70–99)
HCT VFR BLD CALC: 35.7 % — SIGNIFICANT CHANGE UP (ref 34.5–45)
HGB BLD-MCNC: 11.6 G/DL — SIGNIFICANT CHANGE UP (ref 11.5–15.5)
INR BLD: 1.09 RATIO — SIGNIFICANT CHANGE UP (ref 0.88–1.16)
MCHC RBC-ENTMCNC: 30.1 PG — SIGNIFICANT CHANGE UP (ref 27–34)
MCHC RBC-ENTMCNC: 32.5 GM/DL — SIGNIFICANT CHANGE UP (ref 32–36)
MCV RBC AUTO: 92.5 FL — SIGNIFICANT CHANGE UP (ref 80–100)
MRSA PCR RESULT.: SIGNIFICANT CHANGE UP
NRBC # BLD: 0 /100 WBCS — SIGNIFICANT CHANGE UP (ref 0–0)
PLATELET # BLD AUTO: 249 K/UL — SIGNIFICANT CHANGE UP (ref 150–400)
POTASSIUM SERPL-MCNC: 3.7 MMOL/L — SIGNIFICANT CHANGE UP (ref 3.5–5.3)
POTASSIUM SERPL-SCNC: 3.7 MMOL/L — SIGNIFICANT CHANGE UP (ref 3.5–5.3)
PROT SERPL-MCNC: 7.9 G/DL — SIGNIFICANT CHANGE UP (ref 6–8.3)
PROTHROM AB SERPL-ACNC: 12.5 SEC — SIGNIFICANT CHANGE UP (ref 10.5–13.4)
RBC # BLD: 3.86 M/UL — SIGNIFICANT CHANGE UP (ref 3.8–5.2)
RBC # FLD: 13 % — SIGNIFICANT CHANGE UP (ref 10.3–14.5)
S AUREUS DNA NOSE QL NAA+PROBE: SIGNIFICANT CHANGE UP
SODIUM SERPL-SCNC: 140 MMOL/L — SIGNIFICANT CHANGE UP (ref 135–145)
WBC # BLD: 7.95 K/UL — SIGNIFICANT CHANGE UP (ref 3.8–10.5)
WBC # FLD AUTO: 7.95 K/UL — SIGNIFICANT CHANGE UP (ref 3.8–10.5)

## 2022-11-09 PROCEDURE — 93010 ELECTROCARDIOGRAM REPORT: CPT

## 2022-11-09 PROCEDURE — 93005 ELECTROCARDIOGRAM TRACING: CPT

## 2022-11-09 PROCEDURE — G0463: CPT

## 2022-11-09 NOTE — H&P PST ADULT - HEIGHT IN FEET
Routine OB Visit    Subjective:  Zainab Curz presents today for a routine OB visit. No VB, LOF or ctx. Good FM. Had one day of regular ctx, but none since.    Here with 4 yo b/c her ride could not wait. Denies concerns for safety at home.    Objective:  Visit Vitals  BP 96/61 (BP Location: LUE - Left upper extremity, Patient Position: Sitting, Cuff Size: Large Adult)   Pulse 65   Temp 98.2 °F (36.8 °C) (Temporal)   Resp 18   Ht 5' 10\" (1.778 m)   Wt 111.5 kg (245 lb 13 oz)   LMP 2021   BMI 35.27 kg/m²     General appearance -  alert and no distress    Fundal Height (cm) : 36 cm   Fetal Heart Rate: 130s   Alb/Glu  Albumin: Negative  Glucose: Negative    Assessment/Plan:  Zainab Cruz is a 27 year old  at 37w2d here here for routine OB visit. Overall, this is a healthy multipara with reassuring MFS. Pregnancy c/b anemia, GBS+.    Vaccines - still undecided.  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    Has one more Venofer infusion on 10/26. Plan repeat CBC. Sx much improved - more energy.      Follow-up weekly.    Mahsa Grier MD   5

## 2022-11-09 NOTE — H&P PST ADULT - NSICDXPASTMEDICALHX_GEN_ALL_CORE_FT
PAST MEDICAL HISTORY:  Arthritis left knee    Hypertension     Thyroid mass Ct chest finding 1/12/16; enlarged right lobe of thyroid gland extending to mediastinum- f/u with ghoudy ct scan on 11/14/22

## 2022-11-09 NOTE — H&P PST ADULT - HISTORY OF PRESENT ILLNESS
76 y/o female with PMH of HTN, HLD, Oa to left knee for more than 5 years. Failed conservative therapy . Had right knee replacement on 2018  with good outcome. Denies any acute injury. Took meloxicam with some relief. Pain/stiffness that limits her ADLs. Today for PAT in NAD

## 2022-11-09 NOTE — H&P PST ADULT - ASSESSMENT
76 y/o female with left knee pain  Planned surgtery.  Will obtain medical clearance/cardiac clearance  covid testing -to be sche11/26 or /11/27  Pre op instructions provided  patient having CT neck -to f/u thyroid nodules  Instructions provided on medications to continue and to take the day morning of surgery

## 2022-11-09 NOTE — H&P PST ADULT - NSICDXFAMILYHX_GEN_ALL_CORE_FT
FAMILY HISTORY:  Father  Still living? No  Family history of hypertension, Age at diagnosis: Age Unknown    Mother  Still living? No  Family history of diabetes mellitus, Age at diagnosis: Age Unknown

## 2022-11-09 NOTE — H&P PST ADULT - MEDICATION HERBAL REMEDIES, PROFILE
[FreeTextEntry1] : Additional time spent outside of H&P in I reviewing information including but not limited to previous notes and laboratories for this patient 
no

## 2022-11-09 NOTE — H&P PST ADULT - NSICDXPASTSURGICALHX_GEN_ALL_CORE_FT
PAST SURGICAL HISTORY:  History of total right knee replacement 2018    S/P colonoscopy 4 years ago    S/P endoscopy 2 years ago    S/P JOSH (total abdominal hysterectomy)     S/P tonsillectomy

## 2022-11-23 ENCOUNTER — APPOINTMENT (OUTPATIENT)
Dept: ORTHOPEDIC SURGERY | Facility: CLINIC | Age: 77
End: 2022-11-23

## 2022-11-23 VITALS — WEIGHT: 210 LBS | HEIGHT: 62 IN | BODY MASS INDEX: 38.64 KG/M2

## 2022-11-23 DIAGNOSIS — M17.12 UNILATERAL PRIMARY OSTEOARTHRITIS, LEFT KNEE: ICD-10-CM

## 2022-11-23 DIAGNOSIS — Z96.651 PRESENCE OF RIGHT ARTIFICIAL KNEE JOINT: ICD-10-CM

## 2022-11-23 PROCEDURE — 99213 OFFICE O/P EST LOW 20 MIN: CPT

## 2022-11-23 PROCEDURE — 73562 X-RAY EXAM OF KNEE 3: CPT | Mod: LT

## 2022-11-23 RX ORDER — OXYCODONE HYDROCHLORIDE 5 MG/1
5 TABLET ORAL
Refills: 0 | Status: DISCONTINUED | OUTPATIENT
Start: 2022-11-29 | End: 2022-12-02

## 2022-11-23 RX ORDER — OXYCODONE HYDROCHLORIDE 5 MG/1
10 TABLET ORAL
Refills: 0 | Status: DISCONTINUED | OUTPATIENT
Start: 2022-11-29 | End: 2022-12-02

## 2022-11-23 RX ORDER — CELECOXIB 200 MG/1
200 CAPSULE ORAL EVERY 12 HOURS
Refills: 0 | Status: DISCONTINUED | OUTPATIENT
Start: 2022-11-29 | End: 2022-12-02

## 2022-11-23 RX ORDER — POLYETHYLENE GLYCOL 3350 17 G/17G
17 POWDER, FOR SOLUTION ORAL AT BEDTIME
Refills: 0 | Status: DISCONTINUED | OUTPATIENT
Start: 2022-11-29 | End: 2022-12-02

## 2022-11-23 RX ORDER — HYDROMORPHONE HYDROCHLORIDE 2 MG/ML
0.5 INJECTION INTRAMUSCULAR; INTRAVENOUS; SUBCUTANEOUS
Refills: 0 | Status: DISCONTINUED | OUTPATIENT
Start: 2022-11-29 | End: 2022-12-02

## 2022-11-23 RX ORDER — MAGNESIUM HYDROXIDE 400 MG/1
30 TABLET, CHEWABLE ORAL DAILY
Refills: 0 | Status: DISCONTINUED | OUTPATIENT
Start: 2022-11-29 | End: 2022-12-02

## 2022-11-23 RX ORDER — ACETAMINOPHEN 500 MG
1000 TABLET ORAL EVERY 8 HOURS
Refills: 0 | Status: DISCONTINUED | OUTPATIENT
Start: 2022-11-29 | End: 2022-12-02

## 2022-11-23 RX ORDER — PANTOPRAZOLE SODIUM 20 MG/1
40 TABLET, DELAYED RELEASE ORAL
Refills: 0 | Status: DISCONTINUED | OUTPATIENT
Start: 2022-11-29 | End: 2022-12-02

## 2022-11-23 RX ORDER — SODIUM CHLORIDE 9 MG/ML
1000 INJECTION, SOLUTION INTRAVENOUS
Refills: 0 | Status: DISCONTINUED | OUTPATIENT
Start: 2022-11-29 | End: 2022-12-02

## 2022-11-23 RX ORDER — ONDANSETRON 8 MG/1
4 TABLET, FILM COATED ORAL EVERY 6 HOURS
Refills: 0 | Status: DISCONTINUED | OUTPATIENT
Start: 2022-11-29 | End: 2022-12-02

## 2022-11-23 RX ORDER — SENNA PLUS 8.6 MG/1
2 TABLET ORAL AT BEDTIME
Refills: 0 | Status: DISCONTINUED | OUTPATIENT
Start: 2022-11-29 | End: 2022-12-02

## 2022-11-23 NOTE — DISCUSSION/SUMMARY
[de-identified] : End-stage osteoarthritis left knee compromising quality of life and unresponsive medical management\par Plan left total knee replacement

## 2022-11-23 NOTE — PHYSICAL EXAM
[de-identified] : Constitutional:Well nourished , well developed and in no acute distress\par Psychiatric: Alert and oriented to time place and person.Appropriate affect \par Skin:Head, neck, arms and lower extremities:no lesions or discoloration\par HEENT: Normocephalic, EOM intact, Nasal septum midline,\par Respiratory: Unlabored respirations,no audible wheezing ,no tachypnea, no cyanosis\par Cardiovascular: no leg swelling  no ankle edema no JVD, pulse regular\par Vascular: no calf or thigh tenderness, \par Peripheral pulses; intact\par Lymphatics:No groin adenopathy,no lymphedema lower  or upper extremities\par Left knee dorsal pedal pulse 1+ varus alignment range of motion 10/80 crepitus ligaments intact Skin intact

## 2022-11-23 NOTE — HISTORY OF PRESENT ILLNESS
[de-identified] : 77-year-old female presents complaining of spontaneous onset of chronic intermittent pain left knee provoked with any weightbearing.  Pain is unresponsive to medical management to date and compromises daily quality life.  Patient is approximately 5 years status post right total knee replacement with no complaints.

## 2022-11-28 ENCOUNTER — FORM ENCOUNTER (OUTPATIENT)
Age: 77
End: 2022-11-28

## 2022-11-28 ENCOUNTER — TRANSCRIPTION ENCOUNTER (OUTPATIENT)
Age: 77
End: 2022-11-28

## 2022-11-28 LAB — SARS-COV-2 N GENE NPH QL NAA+PROBE: NOT DETECTED

## 2022-11-29 ENCOUNTER — TRANSCRIPTION ENCOUNTER (OUTPATIENT)
Age: 77
End: 2022-11-29

## 2022-11-29 ENCOUNTER — APPOINTMENT (OUTPATIENT)
Dept: ORTHOPEDIC SURGERY | Facility: HOSPITAL | Age: 77
End: 2022-11-29

## 2022-11-29 ENCOUNTER — INPATIENT (INPATIENT)
Facility: HOSPITAL | Age: 77
LOS: 2 days | Discharge: HOME | DRG: 470 | End: 2022-12-02
Attending: ORTHOPAEDIC SURGERY | Admitting: ORTHOPAEDIC SURGERY
Payer: COMMERCIAL

## 2022-11-29 VITALS
HEART RATE: 89 BPM | DIASTOLIC BLOOD PRESSURE: 68 MMHG | OXYGEN SATURATION: 100 % | HEIGHT: 62 IN | WEIGHT: 203.27 LBS | RESPIRATION RATE: 15 BRPM | SYSTOLIC BLOOD PRESSURE: 147 MMHG | TEMPERATURE: 97 F

## 2022-11-29 DIAGNOSIS — Z96.612 PRESENCE OF LEFT ARTIFICIAL SHOULDER JOINT: Chronic | ICD-10-CM

## 2022-11-29 DIAGNOSIS — Z90.89 ACQUIRED ABSENCE OF OTHER ORGANS: Chronic | ICD-10-CM

## 2022-11-29 DIAGNOSIS — I10 ESSENTIAL (PRIMARY) HYPERTENSION: ICD-10-CM

## 2022-11-29 DIAGNOSIS — Z96.652 PRESENCE OF LEFT ARTIFICIAL KNEE JOINT: ICD-10-CM

## 2022-11-29 DIAGNOSIS — Z98.89 OTHER SPECIFIED POSTPROCEDURAL STATES: Chronic | ICD-10-CM

## 2022-11-29 DIAGNOSIS — Z90.710 ACQUIRED ABSENCE OF BOTH CERVIX AND UTERUS: Chronic | ICD-10-CM

## 2022-11-29 DIAGNOSIS — Z29.9 ENCOUNTER FOR PROPHYLACTIC MEASURES, UNSPECIFIED: ICD-10-CM

## 2022-11-29 DIAGNOSIS — M17.12 UNILATERAL PRIMARY OSTEOARTHRITIS, LEFT KNEE: ICD-10-CM

## 2022-11-29 DIAGNOSIS — Z96.651 PRESENCE OF RIGHT ARTIFICIAL KNEE JOINT: Chronic | ICD-10-CM

## 2022-11-29 LAB
ANION GAP SERPL CALC-SCNC: 10 MMOL/L — SIGNIFICANT CHANGE UP (ref 5–17)
BUN SERPL-MCNC: 14 MG/DL — SIGNIFICANT CHANGE UP (ref 7–23)
CALCIUM SERPL-MCNC: 8.8 MG/DL — SIGNIFICANT CHANGE UP (ref 8.4–10.5)
CHLORIDE SERPL-SCNC: 105 MMOL/L — SIGNIFICANT CHANGE UP (ref 96–108)
CO2 SERPL-SCNC: 27 MMOL/L — SIGNIFICANT CHANGE UP (ref 22–31)
CREAT SERPL-MCNC: 0.89 MG/DL — SIGNIFICANT CHANGE UP (ref 0.5–1.3)
EGFR: 68 ML/MIN/1.73M2 — SIGNIFICANT CHANGE UP
GLUCOSE SERPL-MCNC: 113 MG/DL — HIGH (ref 70–99)
HCT VFR BLD CALC: 31.7 % — LOW (ref 34.5–45)
HGB BLD-MCNC: 10.2 G/DL — LOW (ref 11.5–15.5)
MCHC RBC-ENTMCNC: 29.9 PG — SIGNIFICANT CHANGE UP (ref 27–34)
MCHC RBC-ENTMCNC: 32.2 GM/DL — SIGNIFICANT CHANGE UP (ref 32–36)
MCV RBC AUTO: 93 FL — SIGNIFICANT CHANGE UP (ref 80–100)
NRBC # BLD: 0 /100 WBCS — SIGNIFICANT CHANGE UP (ref 0–0)
PLATELET # BLD AUTO: 237 K/UL — SIGNIFICANT CHANGE UP (ref 150–400)
POTASSIUM SERPL-MCNC: 4.4 MMOL/L — SIGNIFICANT CHANGE UP (ref 3.5–5.3)
POTASSIUM SERPL-SCNC: 4.4 MMOL/L — SIGNIFICANT CHANGE UP (ref 3.5–5.3)
RBC # BLD: 3.41 M/UL — LOW (ref 3.8–5.2)
RBC # FLD: 12.7 % — SIGNIFICANT CHANGE UP (ref 10.3–14.5)
SODIUM SERPL-SCNC: 142 MMOL/L — SIGNIFICANT CHANGE UP (ref 135–145)
WBC # BLD: 13.66 K/UL — HIGH (ref 3.8–10.5)
WBC # FLD AUTO: 13.66 K/UL — HIGH (ref 3.8–10.5)

## 2022-11-29 PROCEDURE — 27447 TOTAL KNEE ARTHROPLASTY: CPT | Mod: LT

## 2022-11-29 PROCEDURE — 99221 1ST HOSP IP/OBS SF/LOW 40: CPT

## 2022-11-29 PROCEDURE — 73560 X-RAY EXAM OF KNEE 1 OR 2: CPT | Mod: 26,LT

## 2022-11-29 DEVICE — IMPLANTABLE DEVICE: Type: IMPLANTABLE DEVICE | Site: LEFT | Status: FUNCTIONAL

## 2022-11-29 DEVICE — KIT PREP BONE BIO-PREP: Type: IMPLANTABLE DEVICE | Site: LEFT | Status: FUNCTIONAL

## 2022-11-29 DEVICE — PACK PIN ODYSSEY STRL: Type: IMPLANTABLE DEVICE | Site: LEFT | Status: FUNCTIONAL

## 2022-11-29 DEVICE — CABLE CABLE-RDY PLT TROCH 1.8X635: Type: IMPLANTABLE DEVICE | Site: LEFT | Status: FUNCTIONAL

## 2022-11-29 DEVICE — TRAY TIB I-BEAM FIX BAR 67MM: Type: IMPLANTABLE DEVICE | Site: LEFT | Status: FUNCTIONAL

## 2022-11-29 DEVICE — CEMENT BONE PALACOS PRO HIGH VISCOSITY 80G W GENTAMICIN: Type: IMPLANTABLE DEVICE | Site: LEFT | Status: FUNCTIONAL

## 2022-11-29 RX ORDER — PREGABALIN 225 MG/1
1000 CAPSULE ORAL DAILY
Refills: 0 | Status: DISCONTINUED | OUTPATIENT
Start: 2022-11-29 | End: 2022-12-02

## 2022-11-29 RX ORDER — POLYETHYLENE GLYCOL 3350 17 G/17G
17 POWDER, FOR SOLUTION ORAL
Qty: 0 | Refills: 0 | DISCHARGE
Start: 2022-11-29

## 2022-11-29 RX ORDER — ONDANSETRON 8 MG/1
4 TABLET, FILM COATED ORAL ONCE
Refills: 0 | Status: DISCONTINUED | OUTPATIENT
Start: 2022-11-29 | End: 2022-11-29

## 2022-11-29 RX ORDER — CHLORHEXIDINE GLUCONATE 213 G/1000ML
1 SOLUTION TOPICAL ONCE
Refills: 0 | Status: COMPLETED | OUTPATIENT
Start: 2022-11-29 | End: 2022-11-29

## 2022-11-29 RX ORDER — CHOLECALCIFEROL (VITAMIN D3) 125 MCG
2000 CAPSULE ORAL DAILY
Refills: 0 | Status: DISCONTINUED | OUTPATIENT
Start: 2022-11-29 | End: 2022-12-02

## 2022-11-29 RX ORDER — ACETAMINOPHEN 500 MG
1000 TABLET ORAL ONCE
Refills: 0 | Status: COMPLETED | OUTPATIENT
Start: 2022-11-29 | End: 2022-11-29

## 2022-11-29 RX ORDER — CELECOXIB 200 MG/1
1 CAPSULE ORAL
Qty: 60 | Refills: 0
Start: 2022-11-29 | End: 2022-12-28

## 2022-11-29 RX ORDER — LOSARTAN POTASSIUM 100 MG/1
50 TABLET, FILM COATED ORAL DAILY
Refills: 0 | Status: DISCONTINUED | OUTPATIENT
Start: 2022-12-01 | End: 2022-12-02

## 2022-11-29 RX ORDER — SODIUM CHLORIDE 9 MG/ML
500 INJECTION INTRAMUSCULAR; INTRAVENOUS; SUBCUTANEOUS ONCE
Refills: 0 | Status: COMPLETED | OUTPATIENT
Start: 2022-11-29 | End: 2022-11-29

## 2022-11-29 RX ORDER — CEFAZOLIN SODIUM 1 G
2000 VIAL (EA) INJECTION EVERY 8 HOURS
Refills: 0 | Status: COMPLETED | OUTPATIENT
Start: 2022-11-29 | End: 2022-11-30

## 2022-11-29 RX ORDER — CHLORPHENIRAMINE MALEATE 4 MG
1 TABLET ORAL
Qty: 0 | Refills: 0 | DISCHARGE

## 2022-11-29 RX ORDER — APIXABAN 2.5 MG/1
2.5 TABLET, FILM COATED ORAL EVERY 12 HOURS
Refills: 0 | Status: DISCONTINUED | OUTPATIENT
Start: 2022-11-30 | End: 2022-12-02

## 2022-11-29 RX ORDER — APREPITANT 80 MG/1
40 CAPSULE ORAL ONCE
Refills: 0 | Status: COMPLETED | OUTPATIENT
Start: 2022-11-29 | End: 2022-11-29

## 2022-11-29 RX ORDER — TRANEXAMIC ACID 100 MG/ML
1000 INJECTION, SOLUTION INTRAVENOUS ONCE
Refills: 0 | Status: COMPLETED | OUTPATIENT
Start: 2022-11-29 | End: 2022-11-29

## 2022-11-29 RX ORDER — LOSARTAN POTASSIUM 100 MG/1
1 TABLET, FILM COATED ORAL
Qty: 0 | Refills: 0 | DISCHARGE

## 2022-11-29 RX ORDER — SENNA PLUS 8.6 MG/1
2 TABLET ORAL
Qty: 0 | Refills: 0 | DISCHARGE
Start: 2022-11-29

## 2022-11-29 RX ORDER — HYDROCHLOROTHIAZIDE 25 MG
1 TABLET ORAL
Qty: 0 | Refills: 0 | DISCHARGE

## 2022-11-29 RX ORDER — OMEPRAZOLE 10 MG/1
1 CAPSULE, DELAYED RELEASE ORAL
Qty: 30 | Refills: 1
Start: 2022-11-29 | End: 2023-01-27

## 2022-11-29 RX ORDER — CEFAZOLIN SODIUM 1 G
2000 VIAL (EA) INJECTION ONCE
Refills: 0 | Status: COMPLETED | OUTPATIENT
Start: 2022-11-29 | End: 2022-11-29

## 2022-11-29 RX ORDER — ACETAMINOPHEN 500 MG
2 TABLET ORAL
Qty: 0 | Refills: 0 | DISCHARGE
Start: 2022-11-29

## 2022-11-29 RX ORDER — HYDROMORPHONE HYDROCHLORIDE 2 MG/ML
0.5 INJECTION INTRAMUSCULAR; INTRAVENOUS; SUBCUTANEOUS
Refills: 0 | Status: DISCONTINUED | OUTPATIENT
Start: 2022-11-29 | End: 2022-11-29

## 2022-11-29 RX ORDER — DEXAMETHASONE 0.5 MG/5ML
8 ELIXIR ORAL ONCE
Refills: 0 | Status: COMPLETED | OUTPATIENT
Start: 2022-11-30 | End: 2022-11-30

## 2022-11-29 RX ORDER — PREGABALIN 225 MG/1
1 CAPSULE ORAL
Qty: 0 | Refills: 0 | DISCHARGE

## 2022-11-29 RX ORDER — SODIUM CHLORIDE 9 MG/ML
1000 INJECTION, SOLUTION INTRAVENOUS
Refills: 0 | Status: DISCONTINUED | OUTPATIENT
Start: 2022-11-29 | End: 2022-11-29

## 2022-11-29 RX ORDER — HYDROMORPHONE HYDROCHLORIDE 2 MG/ML
1 INJECTION INTRAMUSCULAR; INTRAVENOUS; SUBCUTANEOUS
Refills: 0 | Status: DISCONTINUED | OUTPATIENT
Start: 2022-11-29 | End: 2022-11-29

## 2022-11-29 RX ADMIN — Medication 1000 MILLIGRAM(S): at 21:24

## 2022-11-29 RX ADMIN — APREPITANT 40 MILLIGRAM(S): 80 CAPSULE ORAL at 07:49

## 2022-11-29 RX ADMIN — SENNA PLUS 2 TABLET(S): 8.6 TABLET ORAL at 21:25

## 2022-11-29 RX ADMIN — POLYETHYLENE GLYCOL 3350 17 GRAM(S): 17 POWDER, FOR SOLUTION ORAL at 21:25

## 2022-11-29 RX ADMIN — Medication 1000 MILLIGRAM(S): at 21:35

## 2022-11-29 RX ADMIN — Medication 100 MILLIGRAM(S): at 17:53

## 2022-11-29 RX ADMIN — CHLORHEXIDINE GLUCONATE 1 APPLICATION(S): 213 SOLUTION TOPICAL at 07:49

## 2022-11-29 RX ADMIN — Medication 1000 MILLIGRAM(S): at 17:05

## 2022-11-29 RX ADMIN — Medication 400 MILLIGRAM(S): at 16:51

## 2022-11-29 RX ADMIN — SODIUM CHLORIDE 500 MILLILITER(S): 9 INJECTION INTRAMUSCULAR; INTRAVENOUS; SUBCUTANEOUS at 14:19

## 2022-11-29 RX ADMIN — Medication 1 TABLET(S): at 17:52

## 2022-11-29 RX ADMIN — CELECOXIB 200 MILLIGRAM(S): 200 CAPSULE ORAL at 21:30

## 2022-11-29 RX ADMIN — SODIUM CHLORIDE 75 MILLILITER(S): 9 INJECTION, SOLUTION INTRAVENOUS at 14:19

## 2022-11-29 RX ADMIN — PREGABALIN 1000 MICROGRAM(S): 225 CAPSULE ORAL at 17:52

## 2022-11-29 RX ADMIN — SODIUM CHLORIDE 500 MILLILITER(S): 9 INJECTION INTRAMUSCULAR; INTRAVENOUS; SUBCUTANEOUS at 19:03

## 2022-11-29 RX ADMIN — CELECOXIB 200 MILLIGRAM(S): 200 CAPSULE ORAL at 21:00

## 2022-11-29 NOTE — DISCHARGE NOTE PROVIDER - NSDCMRMEDTOKEN_GEN_ALL_CORE_FT
Calcium 600+D oral tablet: 1 tab(s) orally once a day  Chlorpheniramine (Allergy): 1   2 times a day, As Needed  Colace 100 mg oral capsule: 2 cap(s) orally once a day, As Needed  hydroCHLOROthiazide 12.5 mg oral tablet: 1 tab(s) orally once a day  losartan 50 mg oral tablet: 1 tab(s) orally once a day  Vitamin B12 1000 mcg oral tablet: 1 tab(s) orally once a day  Vitamin D3 2000 intl units oral tablet: 1 tab(s) orally once a day   acetaminophen 500 mg oral tablet: 2 tab(s) orally every 8 hours  Calcium 600+D oral tablet: 1 tab(s) orally once a day  celecoxib 200 mg oral capsule: 1 cap(s) orally every 12 hours.  Take 2 hrs after ecotrin  Chlorpheniramine (Allergy): 1   2 times a day, As Needed  Colace 100 mg oral capsule: 2 cap(s) orally once a day, As Needed  hydroCHLOROthiazide 12.5 mg oral tablet: 1 tab(s) orally once a day  losartan 50 mg oral tablet: 1 tab(s) orally once a day  omeprazole 20 mg oral delayed release capsule: 1 cap(s) orally once a day  before breakfast   polyethylene glycol 3350 oral powder for reconstitution: 17 gram(s) orally once a day (at bedtime)  senna leaf extract oral tablet: 2 tab(s) orally once a day (at bedtime)  Vitamin B12 1000 mcg oral tablet: 1 tab(s) orally once a day  Vitamin D3 2000 intl units oral tablet: 1 tab(s) orally once a day   acetaminophen 500 mg oral tablet: 2 tab(s) orally every 8 hours  apixaban 2.5 mg oral tablet: 1 tab(s) orally every 12 hours.  Start ecotrin when this course is complete  Calcium 600+D oral tablet: 1 tab(s) orally once a day  celecoxib 200 mg oral capsule: 1 cap(s) orally every 12 hours.  Take 2 hrs after ecotrin  Chlorpheniramine (Allergy): 1   2 times a day, As Needed  Colace 100 mg oral capsule: 2 cap(s) orally once a day, As Needed  Ecotrin Adult Low Strength 81 mg oral delayed release tablet: 1 tab(s) orally every 12 hours.  Start when eliquis course is complete.  take 2 hrs before celebrex  hydroCHLOROthiazide 12.5 mg oral tablet: 1 tab(s) orally once a day  losartan 50 mg oral tablet: 1 tab(s) orally once a day  omeprazole 20 mg oral delayed release capsule: 1 cap(s) orally once a day  before breakfast   oxyCODONE 5 mg oral tablet: 1-2 tab(s) orally every 4 hours, As Needed -Moderate to severe Pain MDD:6   [id number]  polyethylene glycol 3350 oral powder for reconstitution: 17 gram(s) orally once a day (at bedtime)  senna leaf extract oral tablet: 2 tab(s) orally once a day (at bedtime)  Vitamin B12 1000 mcg oral tablet: 1 tab(s) orally once a day  Vitamin D3 2000 intl units oral tablet: 1 tab(s) orally once a day   acetaminophen 500 mg oral tablet: 2 tab(s) orally every 8 hours  apixaban 2.5 mg oral tablet: 1 tab(s) orally every 12 hours.  Start ecotrin when this course is complete  Calcium 600+D oral tablet: 1 tab(s) orally once a day  celecoxib 200 mg oral capsule: 1 cap(s) orally every 12 hours.  Take 2 hrs after ecotrin  Chlorpheniramine (Allergy): 1   2 times a day, As Needed  Colace 100 mg oral capsule: 2 cap(s) orally once a day, As Needed  Ecotrin Adult Low Strength 81 mg oral delayed release tablet: 1 tab(s) orally every 12 hours.  Start when eliquis course is complete.  take 2 hrs before celebrex  ferrous sulfate 325 mg (65 mg elemental iron) oral tablet: 1 tab(s) orally 2 times a day  hydroCHLOROthiazide 12.5 mg oral tablet: 1 tab(s) orally once a day  losartan 50 mg oral tablet: 1 tab(s) orally once a day  omeprazole 20 mg oral delayed release capsule: 1 cap(s) orally once a day  before breakfast   oxyCODONE 5 mg oral tablet: 1-2 tab(s) orally every 4 hours, As Needed -Moderate to severe Pain MDD:6   [id number]  polyethylene glycol 3350 oral powder for reconstitution: 17 gram(s) orally once a day (at bedtime)  senna leaf extract oral tablet: 2 tab(s) orally once a day (at bedtime)  Vitamin B12 1000 mcg oral tablet: 1 tab(s) orally once a day  Vitamin C 500 mg oral tablet: 1 tab(s) orally 2 times a day  with iron  Vitamin D3 2000 intl units oral tablet: 1 tab(s) orally once a day

## 2022-11-29 NOTE — DISCHARGE NOTE NURSING/CASE MANAGEMENT/SOCIAL WORK - NSDCFUADDAPPT_GEN_ALL_CORE_FT
It is advisable to follow up w/ your pcp in 2-3 weeks to be sure there are no underlying problems.  Please consult w/ an endocrinologist for osteoporosisi treatment

## 2022-11-29 NOTE — PROGRESS NOTE ADULT - SUBJECTIVE AND OBJECTIVE BOX
Orthopaedic Post Op Note    Procedure: Left TKR  Surgeon: Hernandez Biggs     77y Female comfortable, without complaints. Up to side of bed with PT. +Voiding.  Reported pain score = 0  Denies N/V, CP, SOB, numbness/tingling of extremities.    PE:  Vital Signs Last 24 Hrs  T(C): 36.6 (29 Nov 2022 14:52), Max: 36.6 (29 Nov 2022 14:52)  T(F): 97.9 (29 Nov 2022 14:52), Max: 97.9 (29 Nov 2022 14:52)  HR: 78 (29 Nov 2022 14:52) (71 - 89)  BP: 134/70 (29 Nov 2022 14:52) (104/44 - 147/68)  RR: 14 (29 Nov 2022 14:52) (12 - 16)  SpO2: 98% (29 Nov 2022 14:52) (98% - 100%)    Parameters below as of 29 Nov 2022 14:52  Patient On (Oxygen Delivery Method): room air      General: Pt alert and oriented   Left Knee Dressing: C/D/I, Cryo/cuff in place, functioning Hemovac drain in place   Bilateral LEs:  Motor:   5/5 dorsiflexion, plantarflexion, EHL  Sensation intact to LT  2+ DP Pulses  SCDs in place        A/P: 77y Female POD#0 s/p Right TKR  - Stable  - Acetaminophen, Celebrex, Oxycodone, Dilaudid for Pain Control   - DVT ppx: Aspirin 81mg q 12 h   - Evie op IV abx: Ancef  - PT, OT per protocol  - F/U AM Labs  DCP = home tomorrow pending PT, OT, medical clearance        Orthopaedic Post Op Note    Procedure: Left TKR  Surgeon: Hernandez Biggs     77y Female comfortable, without complaints. Up to side of bed with PT. +Voiding.  Reported pain score = 0  Denies N/V, CP, SOB, numbness/tingling of extremities.    PE:  Vital Signs Last 24 Hrs  T(C): 36.6 (29 Nov 2022 14:52), Max: 36.6 (29 Nov 2022 14:52)  T(F): 97.9 (29 Nov 2022 14:52), Max: 97.9 (29 Nov 2022 14:52)  HR: 78 (29 Nov 2022 14:52) (71 - 89)  BP: 134/70 (29 Nov 2022 14:52) (104/44 - 147/68)  RR: 14 (29 Nov 2022 14:52) (12 - 16)  SpO2: 98% (29 Nov 2022 14:52) (98% - 100%)    Parameters below as of 29 Nov 2022 14:52  Patient On (Oxygen Delivery Method): room air      General: Pt alert and oriented   Left Knee Dressing: C/D/I, Cryo/cuff in place, functioning Hemovac drain in place   Bilateral LEs:  Motor:   5/5 dorsiflexion, plantarflexion, EHL  Sensation intact to LT  2+ DP Pulses  SCDs in place        A/P: 77y Female POD#0 s/p Right TKR  - Stable  - Acetaminophen, Celebrex, Oxycodone, Dilaudid for Pain Control   - DVT ppx: Eliquis 2.5mg q 12 h    - Evie op IV abx: Ancef  - PT, OT per protocol  - F/U AM Labs  DCP = home tomorrow pending PT, OT, medical clearance

## 2022-11-29 NOTE — DISCHARGE NOTE NURSING/CASE MANAGEMENT/SOCIAL WORK - CAREGIVER ADDRESS
[id number] [facility] [address] [place] [address] [address] [facility] [address] [place] [address]

## 2022-11-29 NOTE — DISCHARGE NOTE PROVIDER - CARE PROVIDER_API CALL
JOSE Biggs (MD)  Orthopaedic Surgery  [address], [address]  [place] [address]  Phone: [phone]  Fax: [phone]  Established Patient  Scheduled Appointment: 12/14/2022 11:30 AM

## 2022-11-29 NOTE — BRIEF OPERATIVE NOTE - VENOUS THROMBOEMBOLISM PROPHYLAXIS THERAPY
venodyne right leg, then venodynes bilat and ecotrin post op (caprini 9) venodyne right leg, then venodynes bilat and eliquis post op (caprini 10)

## 2022-11-29 NOTE — DISCHARGE NOTE NURSING/CASE MANAGEMENT/SOCIAL WORK - NSDCPEFALRISK_GEN_ALL_CORE
For information on Fall & Injury Prevention, visit: [email or web address] OR  [email or web address] OR  https://www.cdc.gov/steadi/patient.html

## 2022-11-29 NOTE — DISCHARGE NOTE PROVIDER - PROVIDER TOKENS
PROVIDER:[TOKEN:[[id number]],SCHEDULEDAPPT:[12/14/2022],SCHEDULEDAPPTTIME:[11:30 AM],ESTABLISHEDPATIENT:[T]]

## 2022-11-29 NOTE — DISCHARGE NOTE NURSING/CASE MANAGEMENT/SOCIAL WORK - PATIENT PORTAL LINK FT
You can access the FollowMyHealth Patient Portal offered by [facility] by registering at the following website: [email or web address]. By joining [email or web address] FollowMyHealth portal, you will also be able to view your health information using other applications (apps) compatible with our system.

## 2022-11-29 NOTE — DISCHARGE NOTE PROVIDER - INSTRUCTIONS
For Constipation :   • Increase your water intake. Drink at least 8 glasses of water daily.  • Try adding fiber to your diet by eating fruits, vegetables and foods that are rich in grains.  • If you do experience constipation, you may take an over-the-counter stool softener/laxative such as Evie Colace, Senekot, miralax or  Milk of Magnesia.

## 2022-11-29 NOTE — CONSULT NOTE ADULT - SUBJECTIVE AND OBJECTIVE BOX
HPI:    REVIEW OF SYSTEMS:    CONSTITUTIONAL: No weakness, fevers or chills  EYES/ENT: No visual changes, no throat pain   RESPIRATORY: No cough, wheezing, hemoptysis; No shortness of breath  CARDIOVASCULAR: No chest pain or palpitations  GASTROINTESTINAL: No abdominal, nausea, vomiting, or hematemesis; No diarrhea or constipation. No melena or hematochezia.  GENITOURINARY: No dysuria, frequency or hematuria  NEUROLOGICAL: No dizziness, numbness, or weakness  SKIN: No itching, burning, rashes, or lesions   All other review of systems is negative unless indicated above.      ALLERGIES:     	No Known Drug Allergies:   	shellfish: Food, Hives, Rash    HOME MEDICATIONS:   * Patient Currently Takes Medications as of 09-Nov-2022 09:24 documented in Structured Notes  · 	losartan 50 mg oral tablet: 1 tab(s) orally once a day  · 	hydroCHLOROthiazide 12.5 mg oral tablet: 1 tab(s) orally once a day  · 	Vitamin B12 1000 mcg oral tablet: 1 tab(s) orally once a day  · 	Chlorpheniramine (Allergy): Last Dose Taken:  , 1   2 times a day, As Needed  · 	Colace 100 mg oral capsule: Last Dose Taken:  , 2 cap(s) orally once a day, As Needed  · 	Vitamin D3 2000 intl units oral tablet: Last Dose Taken:  , 1 tab(s) orally once a day  · 	Calcium 600+D oral tablet: Last Dose Taken:  , 1 tab(s) orally once a day      PAST MEDICAL HISTORY:  Arthritis left knee    Hypertension     Thyroid mass Ct chest finding 1/12/16; enlarged right lobe of thyroid gland extending to mediastinum- f/u with ghoudy ct scan on 11/14/22.     PAST SURGICAL HISTORY:  History of total right knee replacement 2018    S/P colonoscopy 4 years ago    S/P endoscopy 2 years ago    S/P JOSH (total abdominal hysterectomy)     S/P tonsillectomy.     FAMILY HISTORY:  Father  Still living? No  Family history of hypertension, Age at diagnosis: Age Unknown    Mother  Still living? No  Family history of diabetes mellitus, Age at diagnosis: Age Unknown.    VITAL SIGNS:    Vital Signs Last 24 Hrs  T(C): 36.6 (29 Nov 2022 14:52), Max: 36.6 (29 Nov 2022 14:52)  T(F): 97.9 (29 Nov 2022 14:52), Max: 97.9 (29 Nov 2022 14:52)  HR: 78 (29 Nov 2022 14:52) (71 - 89)  BP: 134/70 (29 Nov 2022 14:52) (104/44 - 147/68)  BP(mean): --  RR: 14 (29 Nov 2022 14:52) (12 - 16)  SpO2: 98% (29 Nov 2022 14:52) (98% - 100%)    Parameters below as of 29 Nov 2022 14:52  Patient On (Oxygen Delivery Method): room air        PHYSICAL EXAM:     GENERAL: no acute distress  HEENT: NC/AT, EOMI, neck supple, MMM  RESPIRATORY: LCTAB/L, no rhonchi, rales, or wheezing  CARDIOVASCULAR: RRR, no murmurs, gallops, rubs  ABDOMINAL: soft, non-tender, non-distended, positive bowel sounds   EXTREMITIES: no clubbing, cyanosis, or edema  NEUROLOGICAL: alert and oriented x 3, non-focal  SKIN: no rashes or lesions   MUSCULOSKELETAL: no gross joint deformity              CAPILLARY BLOOD GLUCOSE          MEDICATIONS  (STANDING):  acetaminophen     Tablet .. 1000 milliGRAM(s) Oral every 8 hours  calcium carbonate 1250 mG  + Vitamin D (OsCal 500 + D) 1 Tablet(s) Oral daily  ceFAZolin   IVPB 2000 milliGRAM(s) IV Intermittent every 8 hours  celecoxib 200 milliGRAM(s) Oral every 12 hours  cholecalciferol 2000 Unit(s) Oral daily  cyanocobalamin 1000 MICROGram(s) Oral daily  lactated ringers. 1000 milliLiter(s) (125 mL/Hr) IV Continuous <Continuous>  pantoprazole    Tablet 40 milliGRAM(s) Oral before breakfast  polyethylene glycol 3350 17 Gram(s) Oral at bedtime  senna 2 Tablet(s) Oral at bedtime  sodium chloride 0.9% Bolus 500 milliLiter(s) IV Bolus once       HPI: 76 y/o f w/ PMH of HTN, and left knee osteoarthritis p/w worsening left knee pain acutely over the last few months, persistent, achy, nonradiating, not responding to OTC meds, worsened by movement, disrupting daily life, prompting pt to seek surgical intervention, now s/p left knee arthroplast, stable post-op.    REVIEW OF SYSTEMS:    CONSTITUTIONAL: No weakness, fevers or chills  EYES/ENT: No visual changes, no throat pain   RESPIRATORY: No cough, wheezing, hemoptysis; No shortness of breath  CARDIOVASCULAR: No chest pain or palpitations  GASTROINTESTINAL: No abdominal, nausea, vomiting, or hematemesis; No diarrhea or constipation. No melena or hematochezia.  GENITOURINARY: No dysuria, frequency or hematuria  NEUROLOGICAL: No dizziness, numbness, or weakness  SKIN: No itching, burning, rashes, or lesions   All other review of systems is negative unless indicated above.      ALLERGIES:     	No Known Drug Allergies:   	shellfish: Food, Hives, Rash    HOME MEDICATIONS:   * Patient Currently Takes Medications as of 09-Nov-2022 09:24 documented in Structured Notes  · 	losartan 50 mg oral tablet: 1 tab(s) orally once a day  · 	hydroCHLOROthiazide 12.5 mg oral tablet: 1 tab(s) orally once a day  · 	Vitamin B12 1000 mcg oral tablet: 1 tab(s) orally once a day  · 	Chlorpheniramine (Allergy): Last Dose Taken:  , 1   2 times a day, As Needed  · 	Colace 100 mg oral capsule: Last Dose Taken:  , 2 cap(s) orally once a day, As Needed  · 	Vitamin D3 2000 intl units oral tablet: Last Dose Taken:  , 1 tab(s) orally once a day  · 	Calcium 600+D oral tablet: Last Dose Taken:  , 1 tab(s) orally once a day      PAST MEDICAL HISTORY:  Arthritis left knee    Hypertension     Thyroid mass Ct chest finding 1/12/16; enlarged right lobe of thyroid gland extending to mediastinum- f/u with ghoudy ct scan on 11/14/22.     PAST SURGICAL HISTORY:  History of total right knee replacement 2018    S/P colonoscopy 4 years ago    S/P endoscopy 2 years ago    S/P JOSH (total abdominal hysterectomy)     S/P tonsillectomy.     FAMILY HISTORY:  Father  Still living? No  Family history of hypertension, Age at diagnosis: Age Unknown    Mother  Still living? No  Family history of diabetes mellitus, Age at diagnosis: Age Unknown.    VITAL SIGNS:    Vital Signs Last 24 Hrs  T(C): 36.6 (29 Nov 2022 14:52), Max: 36.6 (29 Nov 2022 14:52)  T(F): 97.9 (29 Nov 2022 14:52), Max: 97.9 (29 Nov 2022 14:52)  HR: 78 (29 Nov 2022 14:52) (71 - 89)  BP: 134/70 (29 Nov 2022 14:52) (104/44 - 147/68)  BP(mean): --  RR: 14 (29 Nov 2022 14:52) (12 - 16)  SpO2: 98% (29 Nov 2022 14:52) (98% - 100%)    Parameters below as of 29 Nov 2022 14:52  Patient On (Oxygen Delivery Method): room air        PHYSICAL EXAM:     GENERAL: no acute distress  HEENT: NC/AT, EOMI, neck supple, MMM  RESPIRATORY: LCTAB/L, no rhonchi, rales, or wheezing  CARDIOVASCULAR: RRR, no murmurs, gallops, rubs  ABDOMINAL: soft, non-tender, non-distended, positive bowel sounds   EXTREMITIES: no clubbing, cyanosis, or edema; s/p left knee arthroplasty  NEUROLOGICAL: alert and oriented x 3, non-focal  SKIN: no rashes or lesions   MUSCULOSKELETAL: s/p left knee arthroplasty              CAPILLARY BLOOD GLUCOSE          MEDICATIONS  (STANDING):  acetaminophen     Tablet .. 1000 milliGRAM(s) Oral every 8 hours  calcium carbonate 1250 mG  + Vitamin D (OsCal 500 + D) 1 Tablet(s) Oral daily  ceFAZolin   IVPB 2000 milliGRAM(s) IV Intermittent every 8 hours  celecoxib 200 milliGRAM(s) Oral every 12 hours  cholecalciferol 2000 Unit(s) Oral daily  cyanocobalamin 1000 MICROGram(s) Oral daily  lactated ringers. 1000 milliLiter(s) (125 mL/Hr) IV Continuous <Continuous>  pantoprazole    Tablet 40 milliGRAM(s) Oral before breakfast  polyethylene glycol 3350 17 Gram(s) Oral at bedtime  senna 2 Tablet(s) Oral at bedtime  sodium chloride 0.9% Bolus 500 milliLiter(s) IV Bolus once

## 2022-11-29 NOTE — DISCHARGE NOTE PROVIDER - NSDCFUADDAPPT_GEN_ALL_CORE_FT
It is advisable to follow up w/ your pcp in 2-3 weeks to be sure there are no underlying problems.  Please consult w/ an endocrinologist for osteoporosisi treatment It is advisable to follow up w/ your pcp in 2-3 weeks to be sure there are no underlying problems.  Please consult w/ an endocrinologist for osteoporosisi treatment  Call Dr Biggs's office for appt next Wed

## 2022-11-29 NOTE — DISCHARGE NOTE PROVIDER - HOSPITAL COURSE
The patient is a 77  y.o. female with severe osteoarthritis of the left knee.  She was evaluated by the PST dept at [facility] and obtained the appropriate medical clearances.  After admission on 11/29/22 and receiving pre-operative parenteral prophylactic antibiotics (ancef), the patient  underwent an  uncomplicated left TKA by orthopedic surgeon Dr. Hernandez Biggs.    A medical consultation from the Hospitalist service was obtained for post-operative medical co-management. Typical Physical & occupational therapy modalities post TKA were performed including ambulation training, range of motion, ADL's, and transfers. Ecotrin 81 mg every 12 hrs, along w/ bilat venodynes, was given for DVT prophylaxis (caprini 9).  The patient had a clean appearing surgical incision with no sign of surgical site infections and had a stable neuro / vascular exam of the operated extremity.  After progression of mobility guided by the PT/ OT staff,  the patient was felt to benefit from further rehabilitative care for restoration to level of function. This was felt to best be accomplished at  home.  Discharge and Orthopedic Care instructions were delineated in the Discharge Plan and reviewed with the patient. All medications were delineated in the medication reconciliation tool and key points were reviewed with the patient. She was deemed stable from an Orthopedic & medical standpoint for discharge today.  Upon completion of Home care she will be  following up with Dr. Biggs for office  follow up Orthopedic care.   The patient is a 77  y.o. female with severe osteoarthritis of the left knee.  She was evaluated by the PST dept at [facility] and obtained the appropriate medical clearances.  After admission on 11/29/22 and receiving pre-operative parenteral prophylactic antibiotics (ancef), the patient  underwent an  uncomplicated left TKA by orthopedic surgeon Dr. Hernandez Biggs.    A medical consultation from the Hospitalist service was obtained for post-operative medical co-management. Typical Physical & occupational therapy modalities post TKA were performed including ambulation training, range of motion, ADL's, and transfers. Eliquis 2.5 mg every 12 hrs, along w/ bilat venodynes, was given for DVT prophylaxis (caprini 10).  The patient had a clean appearing surgical incision with no sign of surgical site infections and had a stable neuro / vascular exam of the operated extremity.  After progression of mobility guided by the PT/ OT staff,  the patient was felt to benefit from further rehabilitative care for restoration to level of function. This was felt to best be accomplished at  home.  Discharge and Orthopedic Care instructions were delineated in the Discharge Plan and reviewed with the patient. All medications were delineated in the medication reconciliation tool and key points were reviewed with the patient. She was deemed stable from an Orthopedic & medical standpoint for discharge today.  Upon completion of Home care she will be  following up with Dr. Biggs for office  follow up Orthopedic care.   The patient is a 77  y.o. female with severe osteoarthritis of the left knee.  She was evaluated by the PST dept at [facility] and obtained the appropriate medical clearances.  After admission on 11/29/22 and receiving pre-operative parenteral prophylactic antibiotics (ancef), the patient  underwent an  uncomplicated left TKA by orthopedic surgeon Dr. Hernandez Biggs.    A medical consultation from the Hospitalist service was obtained for post-operative medical co-management. Typical Physical & occupational therapy modalities post TKA were performed including ambulation training, range of motion, ADL's, and transfers. Eliquis 2.5 mg every 12 hrs, along w/ bilat venodynes, was given for DVT prophylaxis (caprini 10).  On 12/1/22 she was transfused 1 unit PRBCs for h/h 7.3/23.  The patient had a clean appearing surgical incision with no sign of surgical site infections and had a stable neuro / vascular exam of the operated extremity.  After progression of mobility guided by the PT/ OT staff,  the patient was felt to benefit from further rehabilitative care for restoration to level of function. This was felt to best be accomplished at  home.  Discharge and Orthopedic Care instructions were delineated in the Discharge Plan and reviewed with the patient. All medications were delineated in the medication reconciliation tool and key points were reviewed with the patient. She was deemed stable from an Orthopedic & medical standpoint for discharge today.  Upon completion of Home care she will be  following up with Dr. Biggs for office  follow up Orthopedic care.

## 2022-11-29 NOTE — DISCHARGE NOTE PROVIDER - NSDCFUSCHEDAPPT_GEN_ALL_CORE_FT
[facility]  ORTHOSURG [address] [facility]  Scheduled Appointment: 12/14/2022

## 2022-11-29 NOTE — PHYSICAL THERAPY INITIAL EVALUATION ADULT - ADDITIONAL COMMENTS
Pt lives in private home with + ramp to enter home. Ranch style home. + chair glide to basement laundry. tub/curtains/gb/shower chair. Pt used SAC on occasion as needed for pn.

## 2022-11-29 NOTE — DISCHARGE NOTE NURSING/CASE MANAGEMENT/SOCIAL WORK - NSDCDMETYPESERV_GEN_ALL_CORE_FT
Rolling Walker + 3:1 Commode  A rolling walker was delivered to your bedside prior to discharge. You declined the 3-in-1 commode.

## 2022-11-29 NOTE — BRIEF OPERATIVE NOTE - COMMENTS
Implants: abhinav: tibia 67 w/ 14mm constrained PS insert, femur 62.5 left PS w/ 18+40 prox stem and 7.5 offset, patella 28 mm  circlage cable on femur due to fracture

## 2022-11-29 NOTE — DISCHARGE NOTE PROVIDER - NSDCFUADDINST_GEN_ALL_CORE_FT
- Call your doctor if you experience:  • An increase in pain not controlled by pain medication or change in activity or  position.  • Temperature greater than 101° F.  • Redness, increased swelling or foul smelling drainage from or around the  incision.  • Numbness, tingling or a change in color or temperature of the operative leg.  • Call your doctor immediately if you experience chest pain, shortness of breath or calf pain.   Tremfya Counseling: I discussed with the patient the risks of guselkumab including but not limited to immunosuppression, serious infections, and drug reactions.  The patient understands that monitoring is required including a PPD at baseline and must alert us or the primary physician if symptoms of infection or other concerning signs are noted.

## 2022-11-29 NOTE — CONSULT NOTE ADULT - PROBLEM SELECTOR RECOMMENDATION 9
- trend Hb and WBC, fever curve to assess for signs of infection of bleeding  - ancef perioperatively  - bowel regiment for constipation proph with grace, miralax  - pain control as per surg team with tylenol, NSAID, oxycodone, dilaudid prn  - IVF LR  - PT eval

## 2022-11-29 NOTE — DISCHARGE NOTE PROVIDER - NSDCCPCAREPLAN_GEN_ALL_CORE_FT
PRINCIPAL DISCHARGE DIAGNOSIS  Diagnosis: Primary osteoarthritis of left knee  Assessment and Plan of Treatment: left TKA  Physical Therapy/Occupational Therapy for: ambulation, transfers, stairs, ADL's (activities of daily living), range of motion exercises, and isometrics  -Activity  • Weight Bearing as tolerated with rolling walker.  • Take short, frequent walks increasing the distance that you walk each day as tolerated.  • Change your position every hour to decrease pain and stiffness.  • Continue the exercises taught to you by your physical therapist.  • No driving until cleared by the doctor.  • No tub baths, hot tubs, or swimming pools until instructed by your doctor.  • Do not squat down on the floor.  • Do not kneel or twist your knee.  • Range of Motion Goals: Flexion= 120 degrees, Extension = 0 degrees  Keep incision clean and dry. May shower 4 days after surgery if no drainage from incision.  Suture removal 2 weeks after surgery at Surgeon's office.  Use cryocuff for 20 minute sessions w/ at least 1 hr between sessions.  Use a towel or washcloth under cuff; don't place directly on the skin.

## 2022-11-29 NOTE — BRIEF OPERATIVE NOTE - NSEVIDENCEINFORABS_GEN_ALL_CORE
Problem: Patient Care Overview  Goal: Plan of Care Review  Outcome: Ongoing (interventions implemented as appropriate)  Pt connected to CADD pump and its infusing without problems. Will finish on Sunday and leave connected until his appt Monday per Dr. Marin.  VSS. No complaints voiced. Instructed to contact MD with any questions.  AVS given to patient.       
No

## 2022-11-29 NOTE — DISCHARGE NOTE NURSING/CASE MANAGEMENT/SOCIAL WORK - NSSCNAMETXT_GEN_ALL_CORE
[facility] (formerly [facility])   [address] [place] [address]

## 2022-11-30 LAB
ANION GAP SERPL CALC-SCNC: 9 MMOL/L — SIGNIFICANT CHANGE UP (ref 5–17)
BUN SERPL-MCNC: 17 MG/DL — SIGNIFICANT CHANGE UP (ref 7–23)
CALCIUM SERPL-MCNC: 8.6 MG/DL — SIGNIFICANT CHANGE UP (ref 8.4–10.5)
CHLORIDE SERPL-SCNC: 104 MMOL/L — SIGNIFICANT CHANGE UP (ref 96–108)
CO2 SERPL-SCNC: 27 MMOL/L — SIGNIFICANT CHANGE UP (ref 22–31)
CREAT SERPL-MCNC: 1.09 MG/DL — SIGNIFICANT CHANGE UP (ref 0.5–1.3)
EGFR: 53 ML/MIN/1.73M2 — LOW
GLUCOSE SERPL-MCNC: 114 MG/DL — HIGH (ref 70–99)
HCT VFR BLD CALC: 26.5 % — LOW (ref 34.5–45)
HGB BLD-MCNC: 8.7 G/DL — LOW (ref 11.5–15.5)
MCHC RBC-ENTMCNC: 30.2 PG — SIGNIFICANT CHANGE UP (ref 27–34)
MCHC RBC-ENTMCNC: 32.8 GM/DL — SIGNIFICANT CHANGE UP (ref 32–36)
MCV RBC AUTO: 92 FL — SIGNIFICANT CHANGE UP (ref 80–100)
NRBC # BLD: 0 /100 WBCS — SIGNIFICANT CHANGE UP (ref 0–0)
PLATELET # BLD AUTO: 218 K/UL — SIGNIFICANT CHANGE UP (ref 150–400)
POTASSIUM SERPL-MCNC: 4.3 MMOL/L — SIGNIFICANT CHANGE UP (ref 3.5–5.3)
POTASSIUM SERPL-SCNC: 4.3 MMOL/L — SIGNIFICANT CHANGE UP (ref 3.5–5.3)
RBC # BLD: 2.88 M/UL — LOW (ref 3.8–5.2)
RBC # FLD: 12.7 % — SIGNIFICANT CHANGE UP (ref 10.3–14.5)
SODIUM SERPL-SCNC: 140 MMOL/L — SIGNIFICANT CHANGE UP (ref 135–145)
WBC # BLD: 16.75 K/UL — HIGH (ref 3.8–10.5)
WBC # FLD AUTO: 16.75 K/UL — HIGH (ref 3.8–10.5)

## 2022-11-30 PROCEDURE — 73562 X-RAY EXAM OF KNEE 3: CPT | Mod: 26,LT

## 2022-11-30 PROCEDURE — 73700 CT LOWER EXTREMITY W/O DYE: CPT | Mod: 26,LT

## 2022-11-30 PROCEDURE — 99221 1ST HOSP IP/OBS SF/LOW 40: CPT

## 2022-11-30 RX ORDER — ASPIRIN/CALCIUM CARB/MAGNESIUM 324 MG
1 TABLET ORAL
Qty: 28 | Refills: 0
Start: 2022-11-30 | End: 2022-12-13

## 2022-11-30 RX ORDER — APIXABAN 2.5 MG/1
1 TABLET, FILM COATED ORAL
Qty: 28 | Refills: 0
Start: 2022-11-30 | End: 2022-12-13

## 2022-11-30 RX ORDER — OXYCODONE HYDROCHLORIDE 5 MG/1
1 TABLET ORAL
Qty: 20 | Refills: 0
Start: 2022-11-30

## 2022-11-30 RX ADMIN — Medication 1000 MILLIGRAM(S): at 13:19

## 2022-11-30 RX ADMIN — Medication 100 MILLIGRAM(S): at 01:48

## 2022-11-30 RX ADMIN — Medication 101.6 MILLIGRAM(S): at 06:11

## 2022-11-30 RX ADMIN — CELECOXIB 200 MILLIGRAM(S): 200 CAPSULE ORAL at 22:09

## 2022-11-30 RX ADMIN — PANTOPRAZOLE SODIUM 40 MILLIGRAM(S): 20 TABLET, DELAYED RELEASE ORAL at 06:10

## 2022-11-30 RX ADMIN — CELECOXIB 200 MILLIGRAM(S): 200 CAPSULE ORAL at 10:02

## 2022-11-30 RX ADMIN — OXYCODONE HYDROCHLORIDE 5 MILLIGRAM(S): 5 TABLET ORAL at 23:50

## 2022-11-30 RX ADMIN — Medication 1000 MILLIGRAM(S): at 22:30

## 2022-11-30 RX ADMIN — APIXABAN 2.5 MILLIGRAM(S): 2.5 TABLET, FILM COATED ORAL at 22:08

## 2022-11-30 RX ADMIN — Medication 2000 UNIT(S): at 13:20

## 2022-11-30 RX ADMIN — APIXABAN 2.5 MILLIGRAM(S): 2.5 TABLET, FILM COATED ORAL at 09:45

## 2022-11-30 RX ADMIN — Medication 1000 MILLIGRAM(S): at 22:09

## 2022-11-30 RX ADMIN — CELECOXIB 200 MILLIGRAM(S): 200 CAPSULE ORAL at 09:47

## 2022-11-30 RX ADMIN — PREGABALIN 1000 MICROGRAM(S): 225 CAPSULE ORAL at 13:20

## 2022-11-30 RX ADMIN — Medication 1000 MILLIGRAM(S): at 06:28

## 2022-11-30 RX ADMIN — Medication 1000 MILLIGRAM(S): at 06:11

## 2022-11-30 RX ADMIN — SENNA PLUS 2 TABLET(S): 8.6 TABLET ORAL at 22:09

## 2022-11-30 RX ADMIN — POLYETHYLENE GLYCOL 3350 17 GRAM(S): 17 POWDER, FOR SOLUTION ORAL at 22:09

## 2022-11-30 RX ADMIN — Medication 1 TABLET(S): at 13:20

## 2022-11-30 RX ADMIN — Medication 1000 MILLIGRAM(S): at 14:24

## 2022-11-30 RX ADMIN — CELECOXIB 200 MILLIGRAM(S): 200 CAPSULE ORAL at 22:30

## 2022-11-30 NOTE — OCCUPATIONAL THERAPY INITIAL EVALUATION ADULT - NSOTDISCHREC_GEN_A_CORE
Supervision/assist with functional activities prn which pt states family will provide./No skilled OT needs

## 2022-11-30 NOTE — OCCUPATIONAL THERAPY INITIAL EVALUATION ADULT - LIVES WITH, PROFILE
Pt lives [place] in a private home, ramp access to enter with all needs met on the 1st floor with a tub with shower chair. Pt has a stair glide to the basement where her laundry is. Pt reports her family will be available to assist upon discharge to home. Pt occasionally used a SAC prior to admission./alone Pt lives alone in a private home, ramp access to enter with all needs met on the 1st floor with a tub with shower chair. Pt has a stair glide to the basement where her laundry is. Pt reports her family will be available to assist upon discharge to home. Pt occasionally used a SAC prior to admission./alone

## 2022-11-30 NOTE — PROGRESS NOTE ADULT - SUBJECTIVE AND OBJECTIVE BOX
Discharge medication calendar:  Eliquis 2.5mg q12h x 2 weeks then ASA EC 81mg q12h x 2 weeks  Omeprazole 20mg QAM x 4 weeks  Celecoxib 200mg q12h x 2-3 weeks  APAP 1000mg q8h x 2-3 weeks  Narcotic PRN  Docusate 100mg TID while taking narcotic  Miralax, Senna, or Bisacodyl PRN for treatment of constipation

## 2022-11-30 NOTE — PROGRESS NOTE ADULT - SUBJECTIVE AND OBJECTIVE BOX
Patient is a 77y old  Female who presents with a chief complaint of left knee pain--for left TKA (30 Nov 2022 08:52)      INTERVAL HPI/OVERNIGHT EVENTS:    no overnight events  feeling fine, looking forward to recovery    MEDICATIONS  (STANDING):  acetaminophen     Tablet .. 1000 milliGRAM(s) Oral every 8 hours  apixaban 2.5 milliGRAM(s) Oral every 12 hours  calcium carbonate 1250 mG  + Vitamin D (OsCal 500 + D) 1 Tablet(s) Oral daily  celecoxib 200 milliGRAM(s) Oral every 12 hours  cholecalciferol 2000 Unit(s) Oral daily  cyanocobalamin 1000 MICROGram(s) Oral daily  lactated ringers. 1000 milliLiter(s) (125 mL/Hr) IV Continuous <Continuous>  pantoprazole    Tablet 40 milliGRAM(s) Oral before breakfast  polyethylene glycol 3350 17 Gram(s) Oral at bedtime  senna 2 Tablet(s) Oral at bedtime    MEDICATIONS  (PRN):  HYDROmorphone  Injectable 0.5 milliGRAM(s) IV Push every 3 hours PRN breakthrough pain  magnesium hydroxide Suspension 30 milliLiter(s) Oral daily PRN Constipation  ondansetron Injectable 4 milliGRAM(s) IV Push every 6 hours PRN Nausea and/or Vomiting  oxyCODONE    IR 5 milliGRAM(s) Oral every 3 hours PRN Moderate Pain (4 - 6)  oxyCODONE    IR 10 milliGRAM(s) Oral every 3 hours PRN Severe Pain (7 - 10)      Allergies    No Known Drug Allergies  shellfish (Hives; Rash)    Intolerances        REVIEW OF SYSTEMS:  as above    Vital Signs Last 24 Hrs  T(C): 36.8 (30 Nov 2022 07:43), Max: 36.8 (29 Nov 2022 19:12)  T(F): 98.2 (30 Nov 2022 07:43), Max: 98.3 (29 Nov 2022 23:30)  HR: 72 (30 Nov 2022 07:43) (71 - 84)  BP: 131/75 (30 Nov 2022 07:43) (104/44 - 140/71)  BP(mean): --  RR: 16 (30 Nov 2022 07:43) (12 - 16)  SpO2: 98% (30 Nov 2022 07:43) (97% - 99%)    Parameters below as of 30 Nov 2022 07:43  Patient On (Oxygen Delivery Method): room air        PHYSICAL EXAM:  GENERAL: no acute distress  HEENT: NC/AT, EOMI, neck supple, MMM  RESPIRATORY: LCTAB/L, no rhonchi, rales, or wheezing  CARDIOVASCULAR: RRR, no murmurs, gallops, rubs  ABDOMINAL: soft, non-tender, non-distended, positive bowel sounds   EXTREMITIES: no clubbing, cyanosis, or edema; s/p left knee arthroplasty  NEUROLOGICAL: alert and oriented x 3, non-focal  SKIN: no rashes or lesions   MUSCULOSKELETAL: s/p left knee arthroplasty      LABS:                        8.7    16.75 )-----------( 218      ( 30 Nov 2022 06:30 )             26.5     30 Nov 2022 06:30    140    |  104    |  17     ----------------------------<  114    4.3     |  27     |  1.09     Ca    8.6        30 Nov 2022 06:30          CAPILLARY BLOOD GLUCOSE          RADIOLOGY & ADDITIONAL TESTS:

## 2022-11-30 NOTE — PROGRESS NOTE ADULT - SUBJECTIVE AND OBJECTIVE BOX
Procedure:  Left TKR  POD #: 1  S: Pt without complaints. No SOB,CP, N/V. Tolerated Diet well.   Pain comfortable  on  Interval Rx. No oxy used as yet  No BM yet, + flatus, No abdominal pain.  Pain Rx:  acetaminophen     Tablet .. 1000 milliGRAM(s) Oral every 8 hours  celecoxib 200 milliGRAM(s) Oral every 12 hours  HYDROmorphone  Injectable 0.5 milliGRAM(s) IV Push every 3 hours PRN  ondansetron Injectable 4 milliGRAM(s) IV Push every 6 hours PRN  oxyCODONE    IR 5 milliGRAM(s) Oral every 3 hours PRN  oxyCODONE    IR 10 milliGRAM(s) Oral every 3 hours PRN    O: General: On exam, No Apparent Distress  Vital Signs Last 24 Hrs  T(C): 36.8 (30 Nov 2022 07:43), Max: 36.8 (29 Nov 2022 19:12)  T(F): 98.2 (30 Nov 2022 07:43), Max: 98.3 (29 Nov 2022 23:30)  HR: 72 (30 Nov 2022 07:43) (71 - 84)  BP: 131/75 (30 Nov 2022 07:43) (104/44 - 140/71)  BP(mean): --  RR: 16 (30 Nov 2022 07:43) (12 - 16)  SpO2: 98% (30 Nov 2022 07:43) (97% - 100%)    Parameters below as of 30 Nov 2022 07:43  Patient On (Oxygen Delivery Method): room air        Lungs: BS clear bilat.  Heart: RRR no murmur  Abdomen: + BS, soft , benign exam     Ext -- left knee dressing changerd.  Light gauze and spandage applied.  Nylon in place w/o drainage  ROM: 0-95  Neurologic:  Has sensation over feet & toes bilat. Full AROM bilat feet & toes. EHL/AT = 5/5  Vascular: Feet toes warm, pink. DP = 2+. No calf tenderness bilat..  VTEP: On Bilat. Venodynes + apixaban 2.5 milliGRAM(s) Oral every 12 hours    I&O's Detail    29 Nov 2022 07:01  -  30 Nov 2022 07:00  --------------------------------------------------------  IN:    Lactated Ringers: 2100 mL    Sodium Chloride 0.9% Bolus: 500 mL  Total IN: 2600 mL    OUT:    Accordian (mL): 210 mL    Blood Loss (mL): 450 mL    Voided (mL): 600 mL  Total OUT: 1260 mL    Total NET: 1340 mL          Activity in PT yesterday: Walked 10'  Labs yesterday noted.  Hospitalist input noted.  Labs Today:                         10.2   13.66 )-----------( 237      ( 29 Nov 2022 16:56 )             31.7       11-29    142  |  105  |  14  ----------------------------<  113<H>  4.4   |  27  |  0.89    Ca    8.8      29 Nov 2022 16:56        Primary Orthopedic Assessment:  • Stable from Orthopedic perspective  • Neuro motor exam stable  • Labs: CBC / Chem stable      Plan:   • Continue:  PT/OT/WBAT with assistance of a walker/Ice to knee/ Knee ROM         Incentive spirometry encouraged   • Continue DVT prophylaxis as prescribed, including use of compression devices and ankle pumps  • Continue Pain Rx  • Plans per Medicine   • Discharge planning – anticipated discharge is Home D/C with home care & home PT  when medically stable & cleared by PT/OT --this afternoon

## 2022-12-01 LAB
ANION GAP SERPL CALC-SCNC: 9 MMOL/L — SIGNIFICANT CHANGE UP (ref 5–17)
BLD GP AB SCN SERPL QL: SIGNIFICANT CHANGE UP
BUN SERPL-MCNC: 11 MG/DL — SIGNIFICANT CHANGE UP (ref 7–23)
CALCIUM SERPL-MCNC: 8.5 MG/DL — SIGNIFICANT CHANGE UP (ref 8.4–10.5)
CHLORIDE SERPL-SCNC: 105 MMOL/L — SIGNIFICANT CHANGE UP (ref 96–108)
CO2 SERPL-SCNC: 27 MMOL/L — SIGNIFICANT CHANGE UP (ref 22–31)
CREAT SERPL-MCNC: 1.22 MG/DL — SIGNIFICANT CHANGE UP (ref 0.5–1.3)
EGFR: 46 ML/MIN/1.73M2 — LOW
GLUCOSE SERPL-MCNC: 94 MG/DL — SIGNIFICANT CHANGE UP (ref 70–99)
HCT VFR BLD CALC: 22.4 % — LOW (ref 34.5–45)
HCT VFR BLD CALC: 23.4 % — LOW (ref 34.5–45)
HGB BLD-MCNC: 7.3 G/DL — LOW (ref 11.5–15.5)
HGB BLD-MCNC: 7.7 G/DL — LOW (ref 11.5–15.5)
MCHC RBC-ENTMCNC: 30.4 PG — SIGNIFICANT CHANGE UP (ref 27–34)
MCHC RBC-ENTMCNC: 32.9 GM/DL — SIGNIFICANT CHANGE UP (ref 32–36)
MCV RBC AUTO: 92.5 FL — SIGNIFICANT CHANGE UP (ref 80–100)
NRBC # BLD: 0 /100 WBCS — SIGNIFICANT CHANGE UP (ref 0–0)
PLATELET # BLD AUTO: 199 K/UL — SIGNIFICANT CHANGE UP (ref 150–400)
POTASSIUM SERPL-MCNC: 4 MMOL/L — SIGNIFICANT CHANGE UP (ref 3.5–5.3)
POTASSIUM SERPL-SCNC: 4 MMOL/L — SIGNIFICANT CHANGE UP (ref 3.5–5.3)
RBC # BLD: 2.53 M/UL — LOW (ref 3.8–5.2)
RBC # FLD: 13 % — SIGNIFICANT CHANGE UP (ref 10.3–14.5)
SODIUM SERPL-SCNC: 141 MMOL/L — SIGNIFICANT CHANGE UP (ref 135–145)
WBC # BLD: 11.72 K/UL — HIGH (ref 3.8–10.5)
WBC # FLD AUTO: 11.72 K/UL — HIGH (ref 3.8–10.5)

## 2022-12-01 PROCEDURE — 99233 SBSQ HOSP IP/OBS HIGH 50: CPT

## 2022-12-01 RX ADMIN — Medication 1 TABLET(S): at 13:02

## 2022-12-01 RX ADMIN — PANTOPRAZOLE SODIUM 40 MILLIGRAM(S): 20 TABLET, DELAYED RELEASE ORAL at 05:32

## 2022-12-01 RX ADMIN — Medication 1000 MILLIGRAM(S): at 15:27

## 2022-12-01 RX ADMIN — OXYCODONE HYDROCHLORIDE 5 MILLIGRAM(S): 5 TABLET ORAL at 00:10

## 2022-12-01 RX ADMIN — CELECOXIB 200 MILLIGRAM(S): 200 CAPSULE ORAL at 21:38

## 2022-12-01 RX ADMIN — OXYCODONE HYDROCHLORIDE 5 MILLIGRAM(S): 5 TABLET ORAL at 21:55

## 2022-12-01 RX ADMIN — OXYCODONE HYDROCHLORIDE 5 MILLIGRAM(S): 5 TABLET ORAL at 16:04

## 2022-12-01 RX ADMIN — Medication 2000 UNIT(S): at 13:03

## 2022-12-01 RX ADMIN — Medication 1000 MILLIGRAM(S): at 05:32

## 2022-12-01 RX ADMIN — CELECOXIB 200 MILLIGRAM(S): 200 CAPSULE ORAL at 21:26

## 2022-12-01 RX ADMIN — OXYCODONE HYDROCHLORIDE 5 MILLIGRAM(S): 5 TABLET ORAL at 10:48

## 2022-12-01 RX ADMIN — OXYCODONE HYDROCHLORIDE 5 MILLIGRAM(S): 5 TABLET ORAL at 15:25

## 2022-12-01 RX ADMIN — LOSARTAN POTASSIUM 50 MILLIGRAM(S): 100 TABLET, FILM COATED ORAL at 05:32

## 2022-12-01 RX ADMIN — CELECOXIB 200 MILLIGRAM(S): 200 CAPSULE ORAL at 10:17

## 2022-12-01 RX ADMIN — CELECOXIB 200 MILLIGRAM(S): 200 CAPSULE ORAL at 09:52

## 2022-12-01 RX ADMIN — Medication 1000 MILLIGRAM(S): at 21:38

## 2022-12-01 RX ADMIN — OXYCODONE HYDROCHLORIDE 5 MILLIGRAM(S): 5 TABLET ORAL at 21:25

## 2022-12-01 RX ADMIN — Medication 1000 MILLIGRAM(S): at 15:24

## 2022-12-01 RX ADMIN — Medication 1000 MILLIGRAM(S): at 06:19

## 2022-12-01 RX ADMIN — PREGABALIN 1000 MICROGRAM(S): 225 CAPSULE ORAL at 13:02

## 2022-12-01 RX ADMIN — APIXABAN 2.5 MILLIGRAM(S): 2.5 TABLET, FILM COATED ORAL at 09:52

## 2022-12-01 RX ADMIN — OXYCODONE HYDROCHLORIDE 5 MILLIGRAM(S): 5 TABLET ORAL at 10:08

## 2022-12-01 RX ADMIN — Medication 1000 MILLIGRAM(S): at 21:26

## 2022-12-01 RX ADMIN — APIXABAN 2.5 MILLIGRAM(S): 2.5 TABLET, FILM COATED ORAL at 21:26

## 2022-12-01 RX ADMIN — SENNA PLUS 2 TABLET(S): 8.6 TABLET ORAL at 21:26

## 2022-12-01 NOTE — PROGRESS NOTE ADULT - SUBJECTIVE AND OBJECTIVE BOX
Patient is a 77y old  Female who presents with a chief complaint of left knee pain--for left TKA (01 Dec 2022 08:26)      INTERVAL HPI/OVERNIGHT EVENTS:    no overnight events  Hb low at 7.7, repeat ordered    MEDICATIONS  (STANDING):  acetaminophen     Tablet .. 1000 milliGRAM(s) Oral every 8 hours  apixaban 2.5 milliGRAM(s) Oral every 12 hours  calcium carbonate 1250 mG  + Vitamin D (OsCal 500 + D) 1 Tablet(s) Oral daily  celecoxib 200 milliGRAM(s) Oral every 12 hours  cholecalciferol 2000 Unit(s) Oral daily  cyanocobalamin 1000 MICROGram(s) Oral daily  lactated ringers. 1000 milliLiter(s) (125 mL/Hr) IV Continuous <Continuous>  losartan 50 milliGRAM(s) Oral daily  pantoprazole    Tablet 40 milliGRAM(s) Oral before breakfast  polyethylene glycol 3350 17 Gram(s) Oral at bedtime  senna 2 Tablet(s) Oral at bedtime    MEDICATIONS  (PRN):  bisacodyl Suppository 10 milliGRAM(s) Rectal once PRN Constipation  HYDROmorphone  Injectable 0.5 milliGRAM(s) IV Push every 3 hours PRN breakthrough pain  magnesium hydroxide Suspension 30 milliLiter(s) Oral daily PRN Constipation  ondansetron Injectable 4 milliGRAM(s) IV Push every 6 hours PRN Nausea and/or Vomiting  oxyCODONE    IR 5 milliGRAM(s) Oral every 3 hours PRN Moderate Pain (4 - 6)  oxyCODONE    IR 10 milliGRAM(s) Oral every 3 hours PRN Severe Pain (7 - 10)      Allergies    No Known Drug Allergies  shellfish (Hives; Rash)    Intolerances        REVIEW OF SYSTEMS:  as above    Vital Signs Last 24 Hrs  T(C): 36.6 (01 Dec 2022 08:42), Max: 37 (30 Nov 2022 23:26)  T(F): 97.8 (01 Dec 2022 08:42), Max: 98.6 (30 Nov 2022 23:26)  HR: 77 (01 Dec 2022 08:42) (66 - 77)  BP: 114/69 (01 Dec 2022 08:42) (114/69 - 132/71)  BP(mean): --  RR: 18 (01 Dec 2022 08:42) (16 - 18)  SpO2: 96% (01 Dec 2022 08:42) (96% - 98%)    Parameters below as of 30 Nov 2022 23:26  Patient On (Oxygen Delivery Method): room air        PHYSICAL EXAM:  GENERAL: NAD, well-groomed, well-developed  HEAD:  Atraumatic, Normocephalic  EYES: EOMI, PERRLA, conjunctiva and sclera clear  ENMT: Moist mucous membranes, No lesions; No tonsillar erythema, exudates, or enlargement  NECK: Supple, No JVD, Normal thyroid  NERVOUS SYSTEM:  Alert & Oriented X3, Good concentration; All 4 extremities mobile, no gross sensory deficits.   CHEST/LUNG: Clear to auscultation bilaterally; No rales, rhonchi, wheezing, or rubs  HEART: Regular rate and rhythm; No murmurs, rubs, or gallops  ABDOMEN: Soft, Nontender, Nondistended; Bowel sounds present  EXTREMITIES:  2+ Peripheral Pulses, No clubbing, cyanosis, or edema  LYMPH: No lymphadenopathy noted  SKIN: No rashes or lesions    LABS:                        7.7    11.72 )-----------( 199      ( 01 Dec 2022 06:00 )             23.4     01 Dec 2022 06:00    141    |  105    |  11     ----------------------------<  94     4.0     |  27     |  1.22     Ca    8.5        01 Dec 2022 06:00          CAPILLARY BLOOD GLUCOSE          RADIOLOGY & ADDITIONAL TESTS:

## 2022-12-01 NOTE — PROGRESS NOTE ADULT - SUBJECTIVE AND OBJECTIVE BOX
Procedure: Left TKR  POD #:   S: Pt without complaints. No SOB,CP, N/V. Tolerated Diet well.   Pain comfortable  on  Interval Rx.   oxy 5 x 1 yesterday  No BM yet, + flatus, No abdominal pain.  Pain Rx:  acetaminophen     Tablet .. 1000 milliGRAM(s) Oral every 8 hours  celecoxib 200 milliGRAM(s) Oral every 12 hours  HYDROmorphone  Injectable 0.5 milliGRAM(s) IV Push every 3 hours PRN  ondansetron Injectable 4 milliGRAM(s) IV Push every 6 hours PRN  oxyCODONE    IR 5 milliGRAM(s) Oral every 3 hours PRN  oxyCODONE    IR 10 milliGRAM(s) Oral every 3 hours PRN    O: General: On exam, No Apparent Distress  Vital Signs Last 24 Hrs  T(C): 37 (30 Nov 2022 23:26), Max: 37 (30 Nov 2022 23:26)  T(F): 98.6 (30 Nov 2022 23:26), Max: 98.6 (30 Nov 2022 23:26)  HR: 66 (01 Dec 2022 06:29) (66 - 73)  BP: 128/78 (01 Dec 2022 06:29) (128/72 - 132/71)  BP(mean): --  RR: 16 (30 Nov 2022 23:26) (16 - 16)  SpO2: 98% (30 Nov 2022 23:26) (98% - 98%)    Parameters below as of 30 Nov 2022 23:26  Patient On (Oxygen Delivery Method): room air        Lungs: BS clear bilat.  Heart:RRR no murmur   Abdomen: + BS, soft , benign exam     Ext : left knee dressing dry.  nylon intact.  some swelling distal wound.  ROM: 0-80 this a.m.  Neurologic:  Has sensation over feet & toes bilat. Full AROM bilat feet & toes. EHL/AT = 5/5  Vascular: Feet toes warm, pink. DP = 2+. No calf tenderness bilat..  VTEP: On Bilat. Venodynes + apixaban 2.5 milliGRAM(s) Oral every 12 hours    I&O's Detail      Activity in PT yesterday: walked 50' in the morning but felt a crack at the upper tibia.  Labs yesterday noted.  Hospitalist input noted.  Labs Today:                         7.7    11.72 )-----------( 199      ( 01 Dec 2022 06:00 )             23.4       12-01    141  |  105  |  11  ----------------------------<  94  4.0   |  27  |  1.22    Ca    8.5      01 Dec 2022 06:00        Primary Orthopedic Assessment:  • Stable from Orthopedic perspective  • Neuro motor exam stable  • Labs: CBC / Chem stable  - CT done yesterday showed no tibial fracture. There is a small hole in distal femur that occurred during surg.  this was treated w/ circlage cable to prevent fx propogation    Plan:   • Continue:  PT/OT/WBAT with assistance of a walker/Ice to knee/ Knee ROM         Incentive spirometry encouraged   • Continue DVT prophylaxis as prescribed, including use of compression devices and ankle pumps  • Continue Pain Rx  • Plans per Medicine   • Discharge planning – anticipated discharge is Home D/C with home care & home PT when medically stable & cleared by PT/OT--today

## 2022-12-02 VITALS
OXYGEN SATURATION: 100 % | TEMPERATURE: 98 F | RESPIRATION RATE: 16 BRPM | DIASTOLIC BLOOD PRESSURE: 75 MMHG | SYSTOLIC BLOOD PRESSURE: 129 MMHG

## 2022-12-02 LAB
ALBUMIN SERPL ELPH-MCNC: 2.7 G/DL — LOW (ref 3.3–5)
ALP SERPL-CCNC: 46 U/L — SIGNIFICANT CHANGE UP (ref 30–120)
ALT FLD-CCNC: 11 U/L DA — SIGNIFICANT CHANGE UP (ref 10–60)
ANION GAP SERPL CALC-SCNC: 5 MMOL/L — SIGNIFICANT CHANGE UP (ref 5–17)
AST SERPL-CCNC: 21 U/L — SIGNIFICANT CHANGE UP (ref 10–40)
BILIRUB SERPL-MCNC: 0.4 MG/DL — SIGNIFICANT CHANGE UP (ref 0.2–1.2)
BUN SERPL-MCNC: 26 MG/DL — HIGH (ref 7–23)
CALCIUM SERPL-MCNC: 8.5 MG/DL — SIGNIFICANT CHANGE UP (ref 8.4–10.5)
CHLORIDE SERPL-SCNC: 107 MMOL/L — SIGNIFICANT CHANGE UP (ref 96–108)
CO2 SERPL-SCNC: 28 MMOL/L — SIGNIFICANT CHANGE UP (ref 22–31)
CREAT SERPL-MCNC: 1.16 MG/DL — SIGNIFICANT CHANGE UP (ref 0.5–1.3)
EGFR: 49 ML/MIN/1.73M2 — LOW
GLUCOSE SERPL-MCNC: 94 MG/DL — SIGNIFICANT CHANGE UP (ref 70–99)
HCT VFR BLD CALC: 26.6 % — LOW (ref 34.5–45)
HGB BLD-MCNC: 8.5 G/DL — LOW (ref 11.5–15.5)
MCHC RBC-ENTMCNC: 29.8 PG — SIGNIFICANT CHANGE UP (ref 27–34)
MCHC RBC-ENTMCNC: 32 GM/DL — SIGNIFICANT CHANGE UP (ref 32–36)
MCV RBC AUTO: 93.3 FL — SIGNIFICANT CHANGE UP (ref 80–100)
NRBC # BLD: 0 /100 WBCS — SIGNIFICANT CHANGE UP (ref 0–0)
PLATELET # BLD AUTO: 188 K/UL — SIGNIFICANT CHANGE UP (ref 150–400)
POTASSIUM SERPL-MCNC: 4.3 MMOL/L — SIGNIFICANT CHANGE UP (ref 3.5–5.3)
POTASSIUM SERPL-SCNC: 4.3 MMOL/L — SIGNIFICANT CHANGE UP (ref 3.5–5.3)
PROT SERPL-MCNC: 6 G/DL — SIGNIFICANT CHANGE UP (ref 6–8.3)
RBC # BLD: 2.85 M/UL — LOW (ref 3.8–5.2)
RBC # FLD: 13.5 % — SIGNIFICANT CHANGE UP (ref 10.3–14.5)
SODIUM SERPL-SCNC: 140 MMOL/L — SIGNIFICANT CHANGE UP (ref 135–145)
WBC # BLD: 9.59 K/UL — SIGNIFICANT CHANGE UP (ref 3.8–10.5)
WBC # FLD AUTO: 9.59 K/UL — SIGNIFICANT CHANGE UP (ref 3.8–10.5)

## 2022-12-02 PROCEDURE — 97116 GAIT TRAINING THERAPY: CPT

## 2022-12-02 PROCEDURE — C1776: CPT

## 2022-12-02 PROCEDURE — P9016: CPT

## 2022-12-02 PROCEDURE — 73700 CT LOWER EXTREMITY W/O DYE: CPT

## 2022-12-02 PROCEDURE — C1713: CPT

## 2022-12-02 PROCEDURE — 85027 COMPLETE CBC AUTOMATED: CPT

## 2022-12-02 PROCEDURE — 86901 BLOOD TYPING SEROLOGIC RH(D): CPT

## 2022-12-02 PROCEDURE — 86850 RBC ANTIBODY SCREEN: CPT

## 2022-12-02 PROCEDURE — 86900 BLOOD TYPING SEROLOGIC ABO: CPT

## 2022-12-02 PROCEDURE — 85018 HEMOGLOBIN: CPT

## 2022-12-02 PROCEDURE — 80048 BASIC METABOLIC PNL TOTAL CA: CPT

## 2022-12-02 PROCEDURE — 85014 HEMATOCRIT: CPT

## 2022-12-02 PROCEDURE — 86923 COMPATIBILITY TEST ELECTRIC: CPT

## 2022-12-02 PROCEDURE — 97165 OT EVAL LOW COMPLEX 30 MIN: CPT

## 2022-12-02 PROCEDURE — 80053 COMPREHEN METABOLIC PANEL: CPT

## 2022-12-02 PROCEDURE — 36415 COLL VENOUS BLD VENIPUNCTURE: CPT

## 2022-12-02 PROCEDURE — 36430 TRANSFUSION BLD/BLD COMPNT: CPT

## 2022-12-02 PROCEDURE — 99231 SBSQ HOSP IP/OBS SF/LOW 25: CPT

## 2022-12-02 PROCEDURE — 73560 X-RAY EXAM OF KNEE 1 OR 2: CPT

## 2022-12-02 PROCEDURE — 97110 THERAPEUTIC EXERCISES: CPT

## 2022-12-02 PROCEDURE — C1889: CPT

## 2022-12-02 PROCEDURE — 73562 X-RAY EXAM OF KNEE 3: CPT

## 2022-12-02 PROCEDURE — 97161 PT EVAL LOW COMPLEX 20 MIN: CPT

## 2022-12-02 RX ORDER — FERROUS SULFATE 325(65) MG
1 TABLET ORAL
Qty: 0 | Refills: 0 | DISCHARGE

## 2022-12-02 RX ORDER — ASCORBIC ACID 60 MG
1 TABLET,CHEWABLE ORAL
Qty: 0 | Refills: 0 | DISCHARGE

## 2022-12-02 RX ADMIN — Medication 1000 MILLIGRAM(S): at 06:22

## 2022-12-02 RX ADMIN — OXYCODONE HYDROCHLORIDE 5 MILLIGRAM(S): 5 TABLET ORAL at 09:15

## 2022-12-02 RX ADMIN — LOSARTAN POTASSIUM 50 MILLIGRAM(S): 100 TABLET, FILM COATED ORAL at 06:22

## 2022-12-02 RX ADMIN — CELECOXIB 200 MILLIGRAM(S): 200 CAPSULE ORAL at 09:24

## 2022-12-02 RX ADMIN — APIXABAN 2.5 MILLIGRAM(S): 2.5 TABLET, FILM COATED ORAL at 09:24

## 2022-12-02 RX ADMIN — PANTOPRAZOLE SODIUM 40 MILLIGRAM(S): 20 TABLET, DELAYED RELEASE ORAL at 06:22

## 2022-12-02 RX ADMIN — OXYCODONE HYDROCHLORIDE 5 MILLIGRAM(S): 5 TABLET ORAL at 08:30

## 2022-12-02 NOTE — PROGRESS NOTE ADULT - SUBJECTIVE AND OBJECTIVE BOX
Patient is a 77y old  Female who presents with a chief complaint of left knee pain--for left TKA (02 Dec 2022 07:21)      INTERVAL HPI/OVERNIGHT EVENTS:    s/p 1 unit prbc  Hb improved  feeling well      MEDICATIONS  (STANDING):  acetaminophen     Tablet .. 1000 milliGRAM(s) Oral every 8 hours  apixaban 2.5 milliGRAM(s) Oral every 12 hours  calcium carbonate 1250 mG  + Vitamin D (OsCal 500 + D) 1 Tablet(s) Oral daily  celecoxib 200 milliGRAM(s) Oral every 12 hours  cholecalciferol 2000 Unit(s) Oral daily  cyanocobalamin 1000 MICROGram(s) Oral daily  lactated ringers. 1000 milliLiter(s) (125 mL/Hr) IV Continuous <Continuous>  losartan 50 milliGRAM(s) Oral daily  pantoprazole    Tablet 40 milliGRAM(s) Oral before breakfast  polyethylene glycol 3350 17 Gram(s) Oral at bedtime  senna 2 Tablet(s) Oral at bedtime    MEDICATIONS  (PRN):  bisacodyl Suppository 10 milliGRAM(s) Rectal once PRN Constipation  HYDROmorphone  Injectable 0.5 milliGRAM(s) IV Push every 3 hours PRN breakthrough pain  magnesium hydroxide Suspension 30 milliLiter(s) Oral daily PRN Constipation  ondansetron Injectable 4 milliGRAM(s) IV Push every 6 hours PRN Nausea and/or Vomiting  oxyCODONE    IR 5 milliGRAM(s) Oral every 3 hours PRN Moderate Pain (4 - 6)  oxyCODONE    IR 10 milliGRAM(s) Oral every 3 hours PRN Severe Pain (7 - 10)      Allergies    No Known Drug Allergies  shellfish (Hives; Rash)    Intolerances        REVIEW OF SYSTEMS:  CONSTITUTIONAL: No fever, weight loss, or fatigue  EYES: No eye pain, visual disturbances, or discharge  ENMT:  No difficulty hearing, tinnitus, vertigo; No sinus or throat pain  NECK: No pain or stiffness  BREASTS: No pain, masses, or nipple discharge  RESPIRATORY: No cough, wheezing, chills or hemoptysis; No shortness of breath  CARDIOVASCULAR: No chest pain, palpitations, lightheadedness, or leg swelling  GASTROINTESTINAL: No abdominal or epigastric pain. No nausea, vomiting, or hematemesis; No diarrhea or constipation. No melena or hematochezia.  GENITOURINARY: No dysuria, frequency, hematuria, or incontinence  NEUROLOGICAL: No headaches, memory loss, vertigo, loss of strength, numbness, or tremors  SKIN: No itching, burning, rashes, or lesions   LYMPH NODES: No enlarged glands  ENDOCRINE: No heat or cold intolerance; No hair loss; No polydipsia or polyuria  MUSCULOSKELETAL: No joint pain or swelling; No muscle, back, or extremity pain  PSYCHIATRIC: No depression, anxiety, or mood swings  HEME/LYMPH: No easy bruising, or bleeding gums  ALLERGY AND IMMUNOLOGIC: No hives or eczema    Vital Signs Last 24 Hrs  T(C): 36.9 (02 Dec 2022 08:36), Max: 36.9 (02 Dec 2022 08:36)  T(F): 98.5 (02 Dec 2022 08:36), Max: 98.5 (02 Dec 2022 08:36)  HR: 70 (02 Dec 2022 06:23) (70 - 78)  BP: 129/75 (02 Dec 2022 08:36) (98/62 - 129/75)  BP(mean): --  RR: 16 (02 Dec 2022 08:36) (16 - 18)  SpO2: 100% (02 Dec 2022 08:36) (96% - 100%)    Parameters below as of 02 Dec 2022 08:36  Patient On (Oxygen Delivery Method): room air        PHYSICAL EXAM:  GENERAL: NAD, well-groomed, well-developed  HEAD:  Atraumatic, Normocephalic  EYES: EOMI, PERRLA, conjunctiva and sclera clear  ENMT: Moist mucous membranes, No lesions; No tonsillar erythema, exudates, or enlargement  NECK: Supple, No JVD, Normal thyroid  NERVOUS SYSTEM:  Alert & Oriented X3, Good concentration; All 4 extremities mobile, no gross sensory deficits.   CHEST/LUNG: Clear to auscultation bilaterally; No rales, rhonchi, wheezing, or rubs  HEART: Regular rate and rhythm; No murmurs, rubs, or gallops  ABDOMEN: Soft, Nontender, Nondistended; Bowel sounds present  EXTREMITIES:  2+ Peripheral Pulses, No clubbing, cyanosis, or edema  LYMPH: No lymphadenopathy noted  SKIN: No rashes or lesions    LABS:                        8.5    9.59  )-----------( 188      ( 02 Dec 2022 06:00 )             26.6     02 Dec 2022 06:00    140    |  107    |  26     ----------------------------<  94     4.3     |  28     |  1.16     Ca    8.5        02 Dec 2022 06:00    TPro  6.0    /  Alb  2.7    /  TBili  0.4    /  DBili  x      /  AST  21     /  ALT  11     /  AlkPhos  46     02 Dec 2022 06:00        CAPILLARY BLOOD GLUCOSE          RADIOLOGY & ADDITIONAL TESTS:

## 2022-12-02 NOTE — PROGRESS NOTE ADULT - SUBJECTIVE AND OBJECTIVE BOX
Procedure: Left TKR  POD #: 3  S: Pt without complaints. No SOB,CP, N/V. Tolerated Diet well. Realized yesterday afternoon she was feeling more tired. Received 1 unit prbcs.  Pain comfortable  on  Interval Rx. Using oxy 5 spariningly.  No BM yet, + flatus, No abdominal pain.  Pain Rx:  acetaminophen     Tablet .. 1000 milliGRAM(s) Oral every 8 hours  celecoxib 200 milliGRAM(s) Oral every 12 hours  HYDROmorphone  Injectable 0.5 milliGRAM(s) IV Push every 3 hours PRN  ondansetron Injectable 4 milliGRAM(s) IV Push every 6 hours PRN  oxyCODONE    IR 5 milliGRAM(s) Oral every 3 hours PRN  oxyCODONE    IR 10 milliGRAM(s) Oral every 3 hours PRN    O: General: On exam, No Apparent Distress  Vital Signs Last 24 Hrs  T(C): 36.6 (02 Dec 2022 03:19), Max: 36.8 (01 Dec 2022 15:20)  T(F): 97.8 (02 Dec 2022 03:19), Max: 98.2 (01 Dec 2022 15:20)  HR: 70 (02 Dec 2022 06:23) (70 - 78)  BP: 122/66 (02 Dec 2022 06:23) (98/62 - 122/66)  BP(mean): --  RR: 16 (02 Dec 2022 06:23) (16 - 18)  SpO2: 99% (02 Dec 2022 06:23) (96% - 99%)    Parameters below as of 02 Dec 2022 06:23  Patient On (Oxygen Delivery Method): room air        Lungs: BS clear bilat.  Heart: RRR no murmur  Abdomen: + BS, soft , benign exam     Ext -- knee  ROM: 0-85  Neurologic:  Has sensation over feet & toes bilat. Full AROM bilat feet & toes. EHL/AT = 5/5  Vascular: Feet toes warm, pink. DP = 2+. No calf tenderness bilat..  VTEP: On Bilat. Venodynes + apixaban 2.5 milliGRAM(s) Oral every 12 hours    I&O's Detail      Activity in PT yesterday Noted. Walked  Labs yesterday noted.  Hospitalist input noted.  Labs Today: pending                        7.3    x     )-----------( x        ( 01 Dec 2022 12:00 )             22.4       12-01    141  |  105  |  11  ----------------------------<  94  4.0   |  27  |  1.22    Ca    8.5      01 Dec 2022 06:00        Primary Orthopedic Assessment:  • Stable from Orthopedic perspective  • Neuro motor exam stable  • Labs: CBC--post op anemia--rec'ed transfusion. / Chem stable      Plan:   • Continue:  PT/OT/WBAT with assistance of a walker/Ice to knee/ Knee ROM         Incentive spirometry encouraged   • Continue DVT prophylaxis as prescribed, including use of compression devices and ankle pumps  • Continue Pain Rx  • Plans per Medicine  • Discharge planning – anticipated discharge is Home D/C with home care & home PT  when medically stable & cleared by PT/OT--today

## 2022-12-02 NOTE — PROGRESS NOTE ADULT - ASSESSMENT
76 y/o f w/ PMH of HTN, left knee arthritis p/w left knee arthroplast stable post-op     Problem/Recommendation - 1:  ·  Problem: S/P total knee arthroplasty, left.   ·  Recommendation: - trend Hb and WBC, fever curve to assess for signs of infection of bleeding  - ancef perioperatively  - bowel regiment for constipation proph with grace, miralax  - pain control as per surg team with tylenol, NSAID, oxycodone, dilaudid prn  - IVF LR  - PT eval.  Leukocytosis likely reactive, asymptomatic  Anemia likely from procedure, asymptomatic  Medically optimized for DC     Problem/Recommendation - 2:  ·  Problem: HTN (hypertension).   ·  Recommendation: resume home meds once BP stable.     Problem/Recommendation - 3:  ·  Problem: Need for prophylactic measure.   ·  Recommendation: on eliquis.  
78 y/o f w/ PMH of HTN, left knee arthritis p/w left knee arthroplast stable post-op     Problem/Recommendation - 1:  ·  Problem: S/P total knee arthroplasty, left.   ·  Recommendation: - trend Hb and WBC, fever curve to assess for signs of infection of bleeding  - ancef perioperatively  - bowel regiment for constipation proph with grace, miralax  - pain control as per surg team with tylenol, NSAID, oxycodone, dilaudid prn  - IVF LR  - PT eval.  Leukocytosis likely reactive, asymptomatic  Anemia likely from procedure, asymptomatic  s/p 1 unit prbc  Hb improved  Medically optimized for DC     Problem/Recommendation - 2:  ·  Problem: HTN (hypertension).   ·  Recommendation: resume home meds once BP stable.     Problem/Recommendation - 3:  ·  Problem: Need for prophylactic measure.   ·  Recommendation: on eliquis.  
78 y/o f w/ PMH of HTN, left knee arthritis p/w left knee arthroplast stable post-op     Problem/Recommendation - 1:  ·  Problem: S/P total knee arthroplasty, left.   ·  Recommendation: - trend Hb and WBC, fever curve to assess for signs of infection of bleeding  - ancef perioperatively  - bowel regiment for constipation proph with grace, miralax  - pain control as per surg team with tylenol, NSAID, oxycodone, dilaudid prn  - IVF LR  - PT eval.  Leukocytosis likely reactive, asymptomatic  Anemia likely from procedure, asymptomatic  repeat cbc today  if Hb is above 8, can be discharged home     Problem/Recommendation - 2:  ·  Problem: HTN (hypertension).   ·  Recommendation: resume home meds once BP stable.     Problem/Recommendation - 3:  ·  Problem: Need for prophylactic measure.   ·  Recommendation: on eliquis.

## 2022-12-14 ENCOUNTER — APPOINTMENT (OUTPATIENT)
Dept: ORTHOPEDIC SURGERY | Facility: CLINIC | Age: 77
End: 2022-12-14

## 2022-12-14 DIAGNOSIS — Z96.652 PRESENCE OF LEFT ARTIFICIAL KNEE JOINT: ICD-10-CM

## 2022-12-14 PROCEDURE — 99024 POSTOP FOLLOW-UP VISIT: CPT

## 2022-12-14 PROCEDURE — 73562 X-RAY EXAM OF KNEE 3: CPT | Mod: LT

## 2022-12-14 NOTE — HISTORY OF PRESENT ILLNESS
[de-identified] : s/p Left total knee replacement on 11/29/22.  [de-identified] : 76y/o female presents s/p Left total knee replacement on 11/29/22. Patient is doing overall well. Postop pain is well-controlled. She denies any fever or chills. She reports some swelling in her left knee and a sensation of tightness. \par  [de-identified] : Well-nourished no distress\par Left leg swelling left calf no tenderness left knee incision healed sutures intact flexion 0/95 ligaments intact [de-identified] : X-ray left knee multiple views reveal satisfactory alignment left total knee components [de-identified] : Left total knee replacement, rule out left DVT [de-identified] : Suture removal, venous Doppler ultrasound if negative physical therapy follow-up 1 month

## 2023-01-18 ENCOUNTER — APPOINTMENT (OUTPATIENT)
Dept: ORTHOPEDIC SURGERY | Facility: CLINIC | Age: 78
End: 2023-01-18
Payer: MEDICARE

## 2023-01-18 VITALS — WEIGHT: 200 LBS | BODY MASS INDEX: 36.8 KG/M2 | HEIGHT: 62 IN

## 2023-01-18 PROCEDURE — 99024 POSTOP FOLLOW-UP VISIT: CPT

## 2023-01-18 NOTE — HISTORY OF PRESENT ILLNESS
[de-identified] : s/p Left total knee replacement on 11/29/22.  [de-identified] : 76y/o female presents s/p Left total knee replacement on 11/29/22. Patient is doing overall well. Postop pain is well-controlled. She denies any fever or chills.uNDERGOING PHYSICAL THERAPY AND AMBULATING WITH A CANE PREOP PAIN MARKEDLY IMPROVED [de-identified] : Well-nourished no distress\par Left leg swelling left calf no tenderness left knee incision healed sutures intact flexion 0/95 ligaments intact [de-identified] : Left total knee replacement, [de-identified] : pHYSICAL THERAPY FOLLOW-UP 1 MONTH

## 2023-03-29 ENCOUNTER — APPOINTMENT (OUTPATIENT)
Dept: ORTHOPEDIC SURGERY | Facility: CLINIC | Age: 78
End: 2023-03-29
Payer: MEDICARE

## 2023-03-29 DIAGNOSIS — Z96.652 PRESENCE OF LEFT ARTIFICIAL KNEE JOINT: ICD-10-CM

## 2023-03-29 PROCEDURE — 99213 OFFICE O/P EST LOW 20 MIN: CPT

## 2023-03-29 NOTE — HISTORY OF PRESENT ILLNESS
[de-identified] : s/p Left total knee replacement on 11/29/22.  [de-identified] : 76y/o female presents s/p Left total knee replacement on 11/29/22. Patient is doing overall well. Postop pain is well-controlled. She denies any fever or chills.uNDERGOING PHYSICAL THERAPY AND AMBULATING WITH A CANE PREOP PAIN MARKEDLY IMPROVED [de-identified] : Well-nourished no distress\par Left leg swelling left calf no tenderness left knee incision healed sutures intact flexion 0/100 ligaments intactligaments intact [de-identified] : Left total knee replacement, [de-identified] : Follow-up 6 months

## 2023-03-29 NOTE — HISTORY OF PRESENT ILLNESS
[de-identified] : s/p Left total knee replacement on 11/29/22.  [de-identified] : 76y/o female presents s/p Left total knee replacement on 11/29/22. Patient is doing overall well. Postop pain is well-controlled. She denies any fever or chills.uNDERGOING PHYSICAL THERAPY AND AMBULATING WITH A CANE PREOP PAIN MARKEDLY IMPROVED [de-identified] : Well-nourished no distress\par Left leg swelling left calf no tenderness left knee incision healed sutures intact flexion 0/100 ligaments intactligaments intact [de-identified] : Left total knee replacement, [de-identified] : Follow-up 6 months

## 2023-05-02 NOTE — PATIENT PROFILE ADULT. - ALTERNATIVE/HEALING FACILITATED BY
healthy diet/rest/stress management Aklief counseling:  Patient advised to apply a pea-sized amount only at bedtime and wait 30 minutes after washing their face before applying.  If too drying, patient may add a non-comedogenic moisturizer.  The most commonly reported side effects including irritation, redness, scaling, dryness, stinging, burning, itching, and increased risk of sunburn.  The patient verbalized understanding of the proper use and possible adverse effects of retinoids.  All of the patient's questions and concerns were addressed.

## 2023-08-10 ENCOUNTER — TRANSCRIPTION ENCOUNTER (OUTPATIENT)
Age: 78
End: 2023-08-10

## 2023-09-13 ENCOUNTER — APPOINTMENT (OUTPATIENT)
Dept: ORTHOPEDIC SURGERY | Facility: CLINIC | Age: 78
End: 2023-09-13

## 2023-09-21 NOTE — H&P PST ADULT - BMI (KG/M2)
Quality 226: Preventive Care And Screening: Tobacco Use: Screening And Cessation Intervention: Patient screened for tobacco use and is an ex/non-smoker
Detail Level: Zone
Quality 431: Preventive Care And Screening: Unhealthy Alcohol Use - Screening: Patient not identified as an unhealthy alcohol user when screened for unhealthy alcohol use using a systematic screening method
38.4

## 2024-02-14 ENCOUNTER — APPOINTMENT (OUTPATIENT)
Dept: OTOLARYNGOLOGY | Facility: CLINIC | Age: 79
End: 2024-02-14
Payer: MEDICARE

## 2024-02-14 VITALS
RESPIRATION RATE: 16 BRPM | BODY MASS INDEX: 36.8 KG/M2 | HEART RATE: 80 BPM | OXYGEN SATURATION: 98 % | HEIGHT: 62 IN | WEIGHT: 200 LBS | SYSTOLIC BLOOD PRESSURE: 130 MMHG | DIASTOLIC BLOOD PRESSURE: 72 MMHG

## 2024-02-14 DIAGNOSIS — E04.9 NONTOXIC GOITER, UNSPECIFIED: ICD-10-CM

## 2024-02-14 PROCEDURE — 99203 OFFICE O/P NEW LOW 30 MIN: CPT

## 2024-02-14 RX ORDER — ASPIRIN 325 MG/1
325 TABLET ORAL
Qty: 30 | Refills: 0 | Status: DISCONTINUED | COMMUNITY
Start: 2018-04-26 | End: 2024-02-14

## 2024-02-14 RX ORDER — MELOXICAM 7.5 MG/1
7.5 TABLET ORAL
Qty: 30 | Refills: 0 | Status: DISCONTINUED | COMMUNITY
Start: 2017-08-04 | End: 2024-02-14

## 2024-02-14 RX ORDER — ALENDRONATE SODIUM 70 MG/1
70 TABLET ORAL
Qty: 12 | Refills: 0 | Status: DISCONTINUED | COMMUNITY
Start: 2017-10-03 | End: 2024-02-14

## 2024-02-14 RX ORDER — TRAMADOL HYDROCHLORIDE AND ACETAMINOPHEN 37.5; 325 MG/1; MG/1
37.5-325 TABLET, FILM COATED ORAL
Qty: 60 | Refills: 0 | Status: DISCONTINUED | COMMUNITY
Start: 2017-12-06 | End: 2024-02-14

## 2024-02-14 RX ORDER — AMOXICILLIN 500 MG/1
500 CAPSULE ORAL
Qty: 16 | Refills: 0 | Status: DISCONTINUED | COMMUNITY
Start: 2018-02-07 | End: 2024-02-14

## 2024-02-14 NOTE — HISTORY OF PRESENT ILLNESS
[de-identified] : 80 yo female who is being referred for surgical consultation by Dr. Lopez for a goiter.  Reports having a goiter about 9 years.  She had previous biopsies and told to be fine. Not on thyroid medication.  Denies pain, dysphagia,  odynophagia, dysphonia and or dyspnea.  12/16/2023 thyroid ultrasound showing bilateral nodules, stable. Dominant on the right mid lobe mixed measuring 5cm. 11/9/2022 Ct scan stn showing markedly enlarged goiter of the right lobe of the thyroid with substernal extension and compression of the trachea and esophagus.  10/28/2022 thyroid sonogram shwoing enlarged right lobe. multiple thyropid nodules. right mid lower pole 4.7cm , previously measured 5.2cm. Right upper pole nodule 5.4cm, previously 4.7cm . Left midpole  nodule  0.4cm and left lower pole nodule measuring 1.1cm .  Denies family hx of thyroid disease or thyroid cancer.  Non smoker, never. no alcohol consumtion.  Denies cardiac or lung issues. Not on blood thinners.

## 2024-02-14 NOTE — CONSULT LETTER
[Dear  ___] : Dear  [unfilled], [Consult Letter:] : I had the pleasure of evaluating your patient, [unfilled]. [Please see my note below.] : Please see my note below. [Consult Closing:] : Thank you very much for allowing me to participate in the care of this patient.  If you have any questions, please do not hesitate to contact me. [Sincerely,] : Sincerely, [FreeTextEntry2] : Jolanta Lopez MD ( [place], [place]) [FreeTextEntry3] : Rigoberto Fraser MD, FACS     [facility] Associate Chair    Department of Otolaryngology  Professor Otolaryngology & Molecular Medicine [facility]

## 2024-02-14 NOTE — PHYSICAL EXAM
[de-identified] : not able to feel goiter R in neck  [Midline] : trachea located in midline position [Normal] : no rashes

## 2025-03-13 NOTE — H&P PST ADULT - ENERGY EXPENDITURE (METS)
Pt brought to PACU. Report obtained from OR RN and anesthesia. Pt placed on monitor NSR and O2 at 95% on 10L NRB. Oral airway in place.    >6

## 2025-04-28 NOTE — PATIENT PROFILE ADULT. - URINARY CATHETER
[facility]  Care Coordinator Theresa Renteria  [phone]    no

## (undated) DEVICE — DRAPE SPLIT SHEETS 77X120"

## (undated) DEVICE — SOLIDIFIER CANN EXPRESS 3K

## (undated) DEVICE — POSITIONER STIRRUP STRAP W SLIP RING 19X3.5"

## (undated) DEVICE — BLADE SURGICAL #15 CARBON

## (undated) DEVICE — GOWN TRIMAX XXL

## (undated) DEVICE — SOL IRR POUR NS 0.9% 500ML

## (undated) DEVICE — GLV 8 PROTEXIS

## (undated) DEVICE — HOOD FLYTE STRYKER HELMET SHIELD

## (undated) DEVICE — DRAIN JACKSON PRATT 3 SPRING RESERVOIR W 10FR PVC DRAIN

## (undated) DEVICE — IRRISEPT JET LAVAGE W 0.05 PCT CHG

## (undated) DEVICE — BLANKET WARMER UPPER ADULT

## (undated) DEVICE — BAG SPONGE COUNTER EZ

## (undated) DEVICE — DRAPE INSTRUMENT POUCH

## (undated) DEVICE — GLV 8 ESTEEM BLUE

## (undated) DEVICE — SUT POLYSORB 1 27" GS-12 UNDYED

## (undated) DEVICE — DRSG SILVERLON ISLAND 4X14" 2X12" PAD

## (undated) DEVICE — PACK TOTAL KNEE

## (undated) DEVICE — SYM-STRYKER SYSTEM 7: Type: DURABLE MEDICAL EQUIPMENT

## (undated) DEVICE — FOLEY TRAY 14FR 5CC LF BAG CLOSED

## (undated) DEVICE — CONTAINER SPECIMEN PET

## (undated) DEVICE — SPONGE RAYTEC 4X4 16PLY

## (undated) DEVICE — SYR LUER LOK 20CC

## (undated) DEVICE — MARKER SKIN MULTI TIP 6"

## (undated) DEVICE — POSITIONER STRAP ARMBOARD VELCRO TS-30 12

## (undated) DEVICE — SOL IRR BAG NS 0.9% 3000ML

## (undated) DEVICE — CUFF TOURNIQUET 34" DUAL PORT W PLC

## (undated) DEVICE — SUT MONOSOF 3-0 30" C-16

## (undated) DEVICE — BLADE SURGICAL #10 STAINLESS

## (undated) DEVICE — SUT POLYSORB 2-0 30" GS-11 UNDYED

## (undated) DEVICE — SOL IRR POUR H2O 1500ML

## (undated) DEVICE — DRSG SILVERLON ISLAND 4X6" 2X4" PAD

## (undated) DEVICE — KIT OPTIVAC CEMENT MIXER 40GM

## (undated) DEVICE — DRSG WEBRIL 6"

## (undated) DEVICE — WRAP COMPRESSION CALF MED

## (undated) DEVICE — ELCTR PENCIL NEPTUNE SMOKE EVACUATION

## (undated) DEVICE — DRAPE 3/4 SHEET 52X76"

## (undated) DEVICE — DRAPE IOBAN 23X23"